# Patient Record
Sex: FEMALE | Race: WHITE | NOT HISPANIC OR LATINO
[De-identification: names, ages, dates, MRNs, and addresses within clinical notes are randomized per-mention and may not be internally consistent; named-entity substitution may affect disease eponyms.]

---

## 2020-10-02 ENCOUNTER — RX ONLY (OUTPATIENT)
Age: 30
Setting detail: RX ONLY
End: 2020-10-02

## 2020-10-02 ENCOUNTER — APPOINTMENT (RX ONLY)
Dept: URBAN - METROPOLITAN AREA CLINIC 374 | Facility: CLINIC | Age: 30
Setting detail: DERMATOLOGY
End: 2020-10-02

## 2020-10-02 DIAGNOSIS — L30.1 DYSHIDROSIS [POMPHOLYX]: ICD-10-CM

## 2020-10-02 PROCEDURE — ? PRESCRIPTION MEDICATION MANAGEMENT

## 2020-10-02 PROCEDURE — ? PRESCRIPTION

## 2020-10-02 PROCEDURE — ? COUNSELING

## 2020-10-02 PROCEDURE — 99202 OFFICE O/P NEW SF 15 MIN: CPT

## 2020-10-02 PROCEDURE — ? MEDICATION COUNSELING

## 2020-10-02 RX ORDER — TRIAMCINOLONE ACETONIDE 1 MG/G
0.1 OINTMENT TOPICAL BID
Qty: 1 | Refills: 3 | Status: ERX | COMMUNITY
Start: 2020-10-02

## 2020-10-02 RX ORDER — CLOBETASOL PROPIONATE 0.5 MG/G
OINTMENT TOPICAL BID
Qty: 1 | Refills: 3 | Status: ERX | COMMUNITY
Start: 2020-10-02

## 2020-10-02 RX ADMIN — CLOBETASOL PROPIONATE: 0.5 OINTMENT TOPICAL at 00:00

## 2020-10-02 ASSESSMENT — LOCATION DETAILED DESCRIPTION DERM: LOCATION DETAILED: RIGHT THENAR EMINENCE

## 2020-10-02 ASSESSMENT — LOCATION ZONE DERM: LOCATION ZONE: HAND

## 2020-10-02 ASSESSMENT — LOCATION SIMPLE DESCRIPTION DERM: LOCATION SIMPLE: RIGHT HAND

## 2020-10-02 NOTE — PROCEDURE: MEDICATION COUNSELING
Xelsandiz Pregnancy And Lactation Text: This medication is Pregnancy Category D and is not considered safe during pregnancy.  The risk during breast feeding is also uncertain.

## 2020-10-02 NOTE — PROCEDURE: PRESCRIPTION MEDICATION MANAGEMENT
Initiate Treatment: clobetasol 0.05 % topical ointment Bid: Apply to affected areas of hands BID.
Render In Strict Bullet Format?: No
Detail Level: Zone

## 2020-10-30 ENCOUNTER — APPOINTMENT (RX ONLY)
Dept: URBAN - METROPOLITAN AREA CLINIC 374 | Facility: CLINIC | Age: 30
Setting detail: DERMATOLOGY
End: 2020-10-30

## 2020-10-30 DIAGNOSIS — L30.1 DYSHIDROSIS [POMPHOLYX]: ICD-10-CM | Status: WELL CONTROLLED

## 2020-10-30 PROCEDURE — 99213 OFFICE O/P EST LOW 20 MIN: CPT

## 2020-10-30 PROCEDURE — ? PRESCRIPTION

## 2020-10-30 PROCEDURE — ? PRESCRIPTION MEDICATION MANAGEMENT

## 2020-10-30 PROCEDURE — ? MEDICATION COUNSELING

## 2020-10-30 RX ORDER — CRISABOROLE 20 MG/G
1 OINTMENT TOPICAL BID
Qty: 1 | Refills: 3 | Status: ERX | COMMUNITY
Start: 2020-10-30

## 2020-10-30 RX ADMIN — CRISABOROLE 1: 20 OINTMENT TOPICAL at 00:00

## 2020-10-30 ASSESSMENT — LOCATION DETAILED DESCRIPTION DERM
LOCATION DETAILED: LEFT ULNAR PALM
LOCATION DETAILED: RIGHT ULNAR PALM

## 2020-10-30 ASSESSMENT — SEVERITY ASSESSMENT 2020: SEVERITY 2020: ALMOST CLEAR

## 2020-10-30 ASSESSMENT — LOCATION SIMPLE DESCRIPTION DERM
LOCATION SIMPLE: LEFT HAND
LOCATION SIMPLE: RIGHT HAND

## 2020-10-30 ASSESSMENT — LOCATION ZONE DERM: LOCATION ZONE: HAND

## 2020-10-30 ASSESSMENT — BSA RASH: BSA RASH: 2

## 2020-10-30 NOTE — PROCEDURE: MEDICATION COUNSELING
Anxiety    Hernia  lih Mirvaso Counseling: Mirvaso is a topical medication which can decrease superficial blood flow where applied. Side effects are uncommon and include stinging, redness and allergic reactions.

## 2020-10-30 NOTE — PROCEDURE: PRESCRIPTION MEDICATION MANAGEMENT
Discontinue Regimen: Clobetasol ointment (may restart PRN flare up)
Render In Strict Bullet Format?: No
Detail Level: Zone
Samples Given: Eucrisa
Initiate Treatment: Eucrisa 2 % topical ointment: Apply to hands bid

## 2021-01-29 ENCOUNTER — APPOINTMENT (RX ONLY)
Dept: URBAN - METROPOLITAN AREA CLINIC 374 | Facility: CLINIC | Age: 31
Setting detail: DERMATOLOGY
End: 2021-01-29

## 2021-01-29 DIAGNOSIS — L30.1 DYSHIDROSIS [POMPHOLYX]: ICD-10-CM | Status: IMPROVED

## 2021-01-29 PROCEDURE — 99213 OFFICE O/P EST LOW 20 MIN: CPT

## 2021-01-29 PROCEDURE — ? PREDNISONE TAPER

## 2021-01-29 PROCEDURE — ? PRESCRIPTION MEDICATION MANAGEMENT

## 2021-01-29 PROCEDURE — ? PRESCRIPTION

## 2021-01-29 RX ORDER — PREDNISONE 10 MG/1
1 TABLET ORAL QDAY
Qty: 20 | Refills: 0 | Status: ERX | COMMUNITY
Start: 2021-01-29

## 2021-01-29 RX ADMIN — PREDNISONE 1: 10 TABLET ORAL at 00:00

## 2021-01-29 ASSESSMENT — LOCATION DETAILED DESCRIPTION DERM
LOCATION DETAILED: RIGHT ULNAR PALM
LOCATION DETAILED: LEFT ULNAR PALM

## 2021-01-29 ASSESSMENT — LOCATION ZONE DERM: LOCATION ZONE: HAND

## 2021-01-29 ASSESSMENT — LOCATION SIMPLE DESCRIPTION DERM
LOCATION SIMPLE: RIGHT HAND
LOCATION SIMPLE: LEFT HAND

## 2021-01-29 NOTE — PROCEDURE: PRESCRIPTION MEDICATION MANAGEMENT
Discontinue Regimen: Clobetasol ointment (may restart PRN flare up)
Render In Strict Bullet Format?: No
Detail Level: Zone
Continue Regimen: Eucrisa 2 % topical ointment: Apply to hands bid
Initiate Treatment: prednisone 10 mg tablet Qday: Take 40mg x 2days, 30mg x 2 days, 20mg x 2 days, 10mg x 2days

## 2021-03-26 ENCOUNTER — APPOINTMENT (RX ONLY)
Dept: URBAN - METROPOLITAN AREA CLINIC 374 | Facility: CLINIC | Age: 31
Setting detail: DERMATOLOGY
End: 2021-03-26

## 2021-03-26 DIAGNOSIS — L30.1 DYSHIDROSIS [POMPHOLYX]: ICD-10-CM | Status: IMPROVED

## 2021-03-26 PROCEDURE — ? PRESCRIPTION MEDICATION MANAGEMENT

## 2021-03-26 PROCEDURE — 99213 OFFICE O/P EST LOW 20 MIN: CPT

## 2021-03-26 PROCEDURE — ? PRESCRIPTION

## 2021-03-26 RX ORDER — AMMONIUM LACTATE 12 G/100G
LOTION TOPICAL QDAY
Qty: 1 | Refills: 2 | Status: ERX | COMMUNITY
Start: 2021-03-26

## 2021-03-26 RX ORDER — PREDNISONE 10 MG/1
TABLET ORAL QDAY
Qty: 20 | Refills: 0 | Status: ERX

## 2021-03-26 RX ADMIN — AMMONIUM LACTATE: 12 LOTION TOPICAL at 00:00

## 2021-03-26 ASSESSMENT — LOCATION SIMPLE DESCRIPTION DERM
LOCATION SIMPLE: RIGHT HAND
LOCATION SIMPLE: LEFT HAND

## 2021-03-26 ASSESSMENT — LOCATION DETAILED DESCRIPTION DERM
LOCATION DETAILED: RIGHT ULNAR PALM
LOCATION DETAILED: LEFT ULNAR PALM

## 2021-03-26 ASSESSMENT — LOCATION ZONE DERM: LOCATION ZONE: HAND

## 2021-03-26 NOTE — PROCEDURE: PRESCRIPTION MEDICATION MANAGEMENT
Discontinue Regimen: Prednisone 10mg tablets (finished 3 weeks course- another 3week course of prednisone was sent to the pharmacy incase she needed it during summer)
Render In Strict Bullet Format?: No
Detail Level: Zone
Continue Regimen: Eucrisa 2 % topical ointment: Apply to hands bid\\nCloebtasol 0.05% topical ointment prn flare-up
Initiate Treatment: Ammonium lactate 12% topical lotion Qday to the rashes of the hands

## 2021-06-25 ENCOUNTER — APPOINTMENT (RX ONLY)
Dept: URBAN - METROPOLITAN AREA CLINIC 374 | Facility: CLINIC | Age: 31
Setting detail: DERMATOLOGY
End: 2021-06-25

## 2021-06-25 DIAGNOSIS — L70.0 ACNE VULGARIS: ICD-10-CM

## 2021-06-25 DIAGNOSIS — L30.1 DYSHIDROSIS [POMPHOLYX]: ICD-10-CM | Status: STABLE

## 2021-06-25 PROCEDURE — ? TOPICAL RETINOID COUNSELING

## 2021-06-25 PROCEDURE — ? MEDICATION COUNSELING

## 2021-06-25 PROCEDURE — ? PRESCRIPTION MEDICATION MANAGEMENT

## 2021-06-25 PROCEDURE — ? PRESCRIPTION

## 2021-06-25 PROCEDURE — 99214 OFFICE O/P EST MOD 30 MIN: CPT

## 2021-06-25 PROCEDURE — ? PRESCRIPTION SAMPLES PROVIDED

## 2021-06-25 PROCEDURE — ? COUNSELING

## 2021-06-25 RX ORDER — TRETINOIN 0.5 MG/G
LOTION TOPICAL QHS
Qty: 1 | Refills: 2 | Status: ERX | COMMUNITY
Start: 2021-06-25

## 2021-06-25 RX ORDER — SPIRONOLACTONE 50 MG/1
TABLET, FILM COATED ORAL BID
Qty: 60 | Refills: 2 | Status: ERX | COMMUNITY
Start: 2021-06-25

## 2021-06-25 RX ADMIN — SPIRONOLACTONE: 50 TABLET, FILM COATED ORAL at 00:00

## 2021-06-25 RX ADMIN — TRETINOIN: 0.5 LOTION TOPICAL at 00:00

## 2021-06-25 ASSESSMENT — LOCATION DETAILED DESCRIPTION DERM
LOCATION DETAILED: LEFT INFERIOR CENTRAL MALAR CHEEK
LOCATION DETAILED: LEFT ULNAR PALM
LOCATION DETAILED: RIGHT ULNAR PALM
LOCATION DETAILED: RIGHT INFERIOR CENTRAL MALAR CHEEK

## 2021-06-25 ASSESSMENT — LOCATION SIMPLE DESCRIPTION DERM
LOCATION SIMPLE: RIGHT HAND
LOCATION SIMPLE: LEFT HAND
LOCATION SIMPLE: RIGHT CHEEK
LOCATION SIMPLE: LEFT CHEEK

## 2021-06-25 ASSESSMENT — LOCATION ZONE DERM
LOCATION ZONE: FACE
LOCATION ZONE: HAND

## 2021-06-25 NOTE — PROCEDURE: PRESCRIPTION MEDICATION MANAGEMENT
Render In Strict Bullet Format?: No
Detail Level: Zone
Plan: Prednisone 10 mg tablets (use PRN flare up during summer)\\nClobetasol ointment (can use prn flare-up)
Continue Regimen: Eucrisa 2 % topical ointment: Apply to hands bid\\nAmmonium lactate 12% topical lotion Qday to the rashes of the hands
Initiate Treatment: spironolactone 50 mg tablet: Take one tab po BID\\nAltreno 0.05 % lotion: Apply qhs to the acne of the face

## 2021-06-25 NOTE — PROCEDURE: COUNSELING
Isotretinoin Counseling: Patient should get monthly blood tests, not donate blood, not drive at night if vision affected, not share medication, and not undergo elective surgery for 6 months after tx completed. Side effects reviewed, pt to contact office should one occur.
Benzoyl Peroxide Pregnancy And Lactation Text: This medication is Pregnancy Category C. It is unknown if benzoyl peroxide is excreted in breast milk.
Birth Control Pills Counseling: Birth Control Pill Counseling: I discussed with the patient the potential side effects of OCPs including but not limited to increased risk of stroke, heart attack, thrombophlebitis, deep venous thrombosis, hepatic adenomas, breast changes, GI upset, headaches, and depression.  The patient verbalized understanding of the proper use and possible adverse effects of OCPs. All of the patient's questions and concerns were addressed.
Spironolactone Pregnancy And Lactation Text: This medication can cause feminization of the male fetus and should be avoided during pregnancy. The active metabolite is also found in breast milk.
Azithromycin Counseling:  I discussed with the patient the risks of azithromycin including but not limited to GI upset, allergic reaction, drug rash, diarrhea, and yeast infections.
Minocycline Pregnancy And Lactation Text: This medication is Pregnancy Category D and not consider safe during pregnancy. It is also excreted in breast milk.
Topical Clindamycin Counseling: Patient counseled that this medication may cause skin irritation or allergic reactions.  In the event of skin irritation, the patient was advised to reduce the amount of the drug applied or use it less frequently.   The patient verbalized understanding of the proper use and possible adverse effects of clindamycin.  All of the patient's questions and concerns were addressed.
Isotretinoin Pregnancy And Lactation Text: This medication is Pregnancy Category X and is considered extremely dangerous during pregnancy. It is unknown if it is excreted in breast milk.
Topical Retinoid counseling:  Patient advised to apply a pea-sized amount only at bedtime and wait 30 minutes after washing their face before applying.  If too drying, patient may add a non-comedogenic moisturizer. The patient verbalized understanding of the proper use and possible adverse effects of retinoids.  All of the patient's questions and concerns were addressed.
Birth Control Pills Pregnancy And Lactation Text: This medication should be avoided if pregnant and for the first 30 days post-partum.
Tetracycline Counseling: Patient counseled regarding possible photosensitivity and increased risk for sunburn.  Patient instructed to avoid sunlight, if possible.  When exposed to sunlight, patients should wear protective clothing, sunglasses, and sunscreen.  The patient was instructed to call the office immediately if the following severe adverse effects occur:  hearing changes, easy bruising/bleeding, severe headache, or vision changes.  The patient verbalized understanding of the proper use and possible adverse effects of tetracycline.  All of the patient's questions and concerns were addressed. Patient understands to avoid pregnancy while on therapy due to potential birth defects.
Doxycycline Pregnancy And Lactation Text: This medication is Pregnancy Category D and not consider safe during pregnancy. It is also excreted in breast milk but is considered safe for shorter treatment courses.
Azithromycin Pregnancy And Lactation Text: This medication is considered safe during pregnancy and is also secreted in breast milk.
Sarecycline Counseling: Patient advised regarding possible photosensitivity and discoloration of the teeth, skin, lips, tongue and gums.  Patient instructed to avoid sunlight, if possible.  When exposed to sunlight, patients should wear protective clothing, sunglasses, and sunscreen.  The patient was instructed to call the office immediately if the following severe adverse effects occur:  hearing changes, easy bruising/bleeding, severe headache, or vision changes.  The patient verbalized understanding of the proper use and possible adverse effects of sarecycline.  All of the patient's questions and concerns were addressed.
High Dose Vitamin A Counseling: Side effects reviewed, pt to contact office should one occur.
Topical Retinoid Pregnancy And Lactation Text: This medication is Pregnancy Category C. It is unknown if this medication is excreted in breast milk.
Topical Clindamycin Pregnancy And Lactation Text: This medication is Pregnancy Category B and is considered safe during pregnancy. It is unknown if it is excreted in breast milk.
Erythromycin Counseling:  I discussed with the patient the risks of erythromycin including but not limited to GI upset, allergic reaction, drug rash, diarrhea, increase in liver enzymes, and yeast infections.
Use Enhanced Medication Counseling?: No
Dapsone Counseling: I discussed with the patient the risks of dapsone including but not limited to hemolytic anemia, agranulocytosis, rashes, methemoglobinemia, kidney failure, peripheral neuropathy, headaches, GI upset, and liver toxicity.  Patients who start dapsone require monitoring including baseline LFTs and weekly CBCs for the first month, then every month thereafter.  The patient verbalized understanding of the proper use and possible adverse effects of dapsone.  All of the patient's questions and concerns were addressed.
High Dose Vitamin A Pregnancy And Lactation Text: High dose vitamin A therapy is contraindicated during pregnancy and breast feeding.
Bactrim Counseling:  I discussed with the patient the risks of sulfa antibiotics including but not limited to GI upset, allergic reaction, drug rash, diarrhea, dizziness, photosensitivity, and yeast infections.  Rarely, more serious reactions can occur including but not limited to aplastic anemia, agranulocytosis, methemoglobinemia, blood dyscrasias, liver or kidney failure, lung infiltrates or desquamative/blistering drug rashes.
Detail Level: Detailed
Topical Sulfur Applications Counseling: Topical Sulfur Counseling: Patient counseled that this medication may cause skin irritation or allergic reactions.  In the event of skin irritation, the patient was advised to reduce the amount of the drug applied or use it less frequently.   The patient verbalized understanding of the proper use and possible adverse effects of topical sulfur application.  All of the patient's questions and concerns were addressed.
Erythromycin Pregnancy And Lactation Text: This medication is Pregnancy Category B and is considered safe during pregnancy. It is also excreted in breast milk.
Benzoyl Peroxide Counseling: Patient counseled that medicine may cause skin irritation and bleach clothing.  In the event of skin irritation, the patient was advised to reduce the amount of the drug applied or use it less frequently.   The patient verbalized understanding of the proper use and possible adverse effects of benzoyl peroxide.  All of the patient's questions and concerns were addressed.
Tazorac Counseling:  Patient advised that medication is irritating and drying.  Patient may need to apply sparingly and wash off after an hour before eventually leaving it on overnight.  The patient verbalized understanding of the proper use and possible adverse effects of tazorac.  All of the patient's questions and concerns were addressed.
Dapsone Pregnancy And Lactation Text: This medication is Pregnancy Category C and is not considered safe during pregnancy or breast feeding.
Bactrim Pregnancy And Lactation Text: This medication is Pregnancy Category D and is known to cause fetal risk.  It is also excreted in breast milk.
Spironolactone Counseling: Patient advised regarding risks of diarrhea, abdominal pain, hyperkalemia, birth defects (for female patients), liver toxicity and renal toxicity. The patient may need blood work to monitor liver and kidney function and potassium levels while on therapy. The patient verbalized understanding of the proper use and possible adverse effects of spironolactone.  All of the patient's questions and concerns were addressed.
Topical Sulfur Applications Pregnancy And Lactation Text: This medication is Pregnancy Category C and has an unknown safety profile during pregnancy. It is unknown if this topical medication is excreted in breast milk.
Minocycline Counseling: Patient advised regarding possible photosensitivity and discoloration of the teeth, skin, lips, tongue and gums.  Patient instructed to avoid sunlight, if possible.  When exposed to sunlight, patients should wear protective clothing, sunglasses, and sunscreen.  The patient was instructed to call the office immediately if the following severe adverse effects occur:  hearing changes, easy bruising/bleeding, severe headache, or vision changes.  The patient verbalized understanding of the proper use and possible adverse effects of minocycline.  All of the patient's questions and concerns were addressed.
Tazorac Pregnancy And Lactation Text: This medication is not safe during pregnancy. It is unknown if this medication is excreted in breast milk.
Doxycycline Counseling:  Patient counseled regarding possible photosensitivity and increased risk for sunburn.  Patient instructed to avoid sunlight, if possible.  When exposed to sunlight, patients should wear protective clothing, sunglasses, and sunscreen.  The patient was instructed to call the office immediately if the following severe adverse effects occur:  hearing changes, easy bruising/bleeding, severe headache, or vision changes.  The patient verbalized understanding of the proper use and possible adverse effects of doxycycline.  All of the patient's questions and concerns were addressed.

## 2021-08-20 ENCOUNTER — RX ONLY (OUTPATIENT)
Age: 31
Setting detail: RX ONLY
End: 2021-08-20

## 2021-08-20 ENCOUNTER — APPOINTMENT (RX ONLY)
Dept: URBAN - METROPOLITAN AREA CLINIC 374 | Facility: CLINIC | Age: 31
Setting detail: DERMATOLOGY
End: 2021-08-20

## 2021-08-20 DIAGNOSIS — L21.8 OTHER SEBORRHEIC DERMATITIS: ICD-10-CM

## 2021-08-20 DIAGNOSIS — L70.0 ACNE VULGARIS: ICD-10-CM | Status: IMPROVED

## 2021-08-20 DIAGNOSIS — L30.1 DYSHIDROSIS [POMPHOLYX]: ICD-10-CM | Status: IMPROVED

## 2021-08-20 PROCEDURE — ? PRESCRIPTION MEDICATION MANAGEMENT

## 2021-08-20 PROCEDURE — ? PRESCRIPTION

## 2021-08-20 PROCEDURE — ? RECOMMENDATIONS

## 2021-08-20 PROCEDURE — ? TREATMENT REGIMEN

## 2021-08-20 PROCEDURE — 99214 OFFICE O/P EST MOD 30 MIN: CPT

## 2021-08-20 PROCEDURE — ? COUNSELING

## 2021-08-20 RX ORDER — KETOCONAZOLE 20 MG/ML
SHAMPOO, SUSPENSION TOPICAL QWEEK
Qty: 1 | Refills: 3 | Status: ERX | COMMUNITY
Start: 2021-08-20

## 2021-08-20 RX ORDER — SPIRONOLACTONE 50 MG/1
TABLET, FILM COATED ORAL BID
Qty: 60 | Refills: 4 | Status: ERX | COMMUNITY
Start: 2021-08-20

## 2021-08-20 RX ADMIN — KETOCONAZOLE: 20 SHAMPOO, SUSPENSION TOPICAL at 00:00

## 2021-08-20 ASSESSMENT — LOCATION ZONE DERM
LOCATION ZONE: FACE
LOCATION ZONE: SCALP
LOCATION ZONE: HAND

## 2021-08-20 ASSESSMENT — LOCATION DETAILED DESCRIPTION DERM
LOCATION DETAILED: LEFT INFERIOR CENTRAL MALAR CHEEK
LOCATION DETAILED: RIGHT ULNAR PALM
LOCATION DETAILED: RIGHT INFERIOR CENTRAL MALAR CHEEK
LOCATION DETAILED: LEFT SUPERIOR PARIETAL SCALP
LOCATION DETAILED: LEFT ULNAR PALM
LOCATION DETAILED: RIGHT CENTRAL PARIETAL SCALP

## 2021-08-20 ASSESSMENT — LOCATION SIMPLE DESCRIPTION DERM
LOCATION SIMPLE: RIGHT HAND
LOCATION SIMPLE: LEFT HAND
LOCATION SIMPLE: SCALP
LOCATION SIMPLE: RIGHT CHEEK
LOCATION SIMPLE: LEFT CHEEK

## 2021-08-20 NOTE — PROCEDURE: PRESCRIPTION MEDICATION MANAGEMENT
Render In Strict Bullet Format?: No
Detail Level: Zone
Plan: Prednisone 10 mg tablets (use PRN flare up during summer)\\nClobetasol ointment (can use prn flare-up)
Continue Regimen: Eucrisa 2 % topical ointment: Apply to hands bid\\nAmmonium lactate 12% topical lotion Qday to the rashes of the hands
Initiate Treatment: OTC Cutemol cream Qday to the eczema of the hands \\nOTC eucerin hand moisturizers Qday to the rashes of the hands
Continue Regimen: spironolactone 50 mg tablet: Take one tab po BID\\nAltreno 0.05 % lotion: Apply qhs to the acne of the face
Initiate Treatment: ketoconazole 2 % shampoo: Use each time wash hair, lather onto scalp, let sit for 3-5 minutes then rinse well qweek

## 2021-08-20 NOTE — PROCEDURE: RECOMMENDATIONS
Recommendations (Free Text): Cutemol cream or moisturizer for the eczema of hands
Render Risk Assessment In Note?: no
Detail Level: Detailed
Recommendation Preamble: The following recommendations were made during the visit:

## 2021-12-17 ENCOUNTER — RX ONLY (OUTPATIENT)
Age: 31
Setting detail: RX ONLY
End: 2021-12-17

## 2021-12-17 ENCOUNTER — APPOINTMENT (RX ONLY)
Dept: URBAN - METROPOLITAN AREA CLINIC 374 | Facility: CLINIC | Age: 31
Setting detail: DERMATOLOGY
End: 2021-12-17

## 2021-12-17 DIAGNOSIS — L21.8 OTHER SEBORRHEIC DERMATITIS: ICD-10-CM | Status: INADEQUATELY CONTROLLED

## 2021-12-17 DIAGNOSIS — L70.0 ACNE VULGARIS: ICD-10-CM | Status: INADEQUATELY CONTROLLED

## 2021-12-17 DIAGNOSIS — L30.1 DYSHIDROSIS [POMPHOLYX]: ICD-10-CM | Status: WELL CONTROLLED

## 2021-12-17 PROCEDURE — 99214 OFFICE O/P EST MOD 30 MIN: CPT

## 2021-12-17 PROCEDURE — ? TREATMENT REGIMEN

## 2021-12-17 PROCEDURE — ? MEDICATION COUNSELING

## 2021-12-17 PROCEDURE — ? PRESCRIPTION MEDICATION MANAGEMENT

## 2021-12-17 PROCEDURE — ? COUNSELING

## 2021-12-17 PROCEDURE — ? PRESCRIPTION

## 2021-12-17 RX ORDER — CLASCOTERONE 1 G/100G
CREAM TOPICAL QAM
Qty: 60 | Refills: 3 | Status: CANCELLED | COMMUNITY
Start: 2021-12-17

## 2021-12-17 RX ORDER — KETOCONAZOLE 20 MG/ML
SHAMPOO, SUSPENSION TOPICAL QDAY
Qty: 120 | Refills: 3 | Status: ERX

## 2021-12-17 RX ORDER — METRONIDAZOLE 7.5 MG/G
CREAM TOPICAL QHS
Qty: 45 | Refills: 3 | Status: ERX | COMMUNITY
Start: 2021-12-17

## 2021-12-17 RX ORDER — CLOBETASOL PROPIONATE 0.5 MG/ML
SOLUTION TOPICAL QHS
Qty: 50 | Refills: 3 | Status: ERX | COMMUNITY
Start: 2021-12-17

## 2021-12-17 RX ADMIN — CLASCOTERONE: 1 CREAM TOPICAL at 00:00

## 2021-12-17 RX ADMIN — METRONIDAZOLE: 7.5 CREAM TOPICAL at 00:00

## 2021-12-17 RX ADMIN — CLOBETASOL PROPIONATE: 0.5 SOLUTION TOPICAL at 00:00

## 2021-12-17 ASSESSMENT — LOCATION ZONE DERM
LOCATION ZONE: FACE
LOCATION ZONE: HAND
LOCATION ZONE: SCALP

## 2021-12-17 ASSESSMENT — LOCATION SIMPLE DESCRIPTION DERM
LOCATION SIMPLE: SCALP
LOCATION SIMPLE: LEFT CHEEK
LOCATION SIMPLE: LEFT HAND
LOCATION SIMPLE: RIGHT CHEEK
LOCATION SIMPLE: RIGHT HAND

## 2021-12-17 ASSESSMENT — LOCATION DETAILED DESCRIPTION DERM
LOCATION DETAILED: LEFT SUPERIOR PARIETAL SCALP
LOCATION DETAILED: LEFT ULNAR PALM
LOCATION DETAILED: RIGHT CENTRAL PARIETAL SCALP
LOCATION DETAILED: RIGHT INFERIOR CENTRAL MALAR CHEEK
LOCATION DETAILED: LEFT INFERIOR CENTRAL MALAR CHEEK
LOCATION DETAILED: RIGHT ULNAR PALM

## 2021-12-17 NOTE — PROCEDURE: PRESCRIPTION MEDICATION MANAGEMENT
Render In Strict Bullet Format?: No
Detail Level: Zone
Continue Regimen: Eucrisa 2 % topical ointment: Apply to hands bid\\nAmmonium lactate 12% topical lotion Qday to the rashes of the hands
Discontinue Regimen: Altreno 0.05 % lotion: Apply qhs to the acne of the face
Continue Regimen: spironolactone 50 mg tablet: Take one tab po BID
Initiate Treatment: Rosadan 0.75 % topical cream Qhs: Apply thin layer to face qhs\\nWinlevi 1 % topical cream Qam: Apply a thin layer to face QAM
Modify Regimen: ketoconazole 2 % shampoo: Use each time wash hair, lather onto scalp, let sit for 3-5 minutes then rinse well qday
Initiate Treatment: clobetasol 0.05 % scalp solution QHS: Scatter several drops onto affected areas of scalp QHS/ prn flares

## 2022-01-17 RX ORDER — CLASCOTERONE 1 G/100G
CREAM TOPICAL QAM
Qty: 60 | Refills: 3 | Status: CANCELLED

## 2022-01-26 ENCOUNTER — RX ONLY (OUTPATIENT)
Age: 32
Setting detail: RX ONLY
End: 2022-01-26

## 2022-01-26 RX ORDER — DAPSONE 50 MG/G
1 GEL TOPICAL QAM
Qty: 60 | Refills: 2 | Status: ERX | COMMUNITY
Start: 2022-01-26

## 2022-02-18 ENCOUNTER — APPOINTMENT (RX ONLY)
Dept: URBAN - METROPOLITAN AREA CLINIC 374 | Facility: CLINIC | Age: 32
Setting detail: DERMATOLOGY
End: 2022-02-18

## 2022-02-18 DIAGNOSIS — L21.8 OTHER SEBORRHEIC DERMATITIS: ICD-10-CM | Status: INADEQUATELY CONTROLLED

## 2022-02-18 DIAGNOSIS — L70.0 ACNE VULGARIS: ICD-10-CM | Status: INADEQUATELY CONTROLLED

## 2022-02-18 DIAGNOSIS — L30.1 DYSHIDROSIS [POMPHOLYX]: ICD-10-CM | Status: WELL CONTROLLED

## 2022-02-18 PROCEDURE — ? TREATMENT REGIMEN

## 2022-02-18 PROCEDURE — ? ADDITIONAL NOTES

## 2022-02-18 PROCEDURE — ? MEDICATION COUNSELING

## 2022-02-18 PROCEDURE — ? PRESCRIPTION

## 2022-02-18 PROCEDURE — ? PRESCRIPTION MEDICATION MANAGEMENT

## 2022-02-18 PROCEDURE — 99214 OFFICE O/P EST MOD 30 MIN: CPT

## 2022-02-18 PROCEDURE — ? COUNSELING

## 2022-02-18 RX ORDER — DOXYCYCLINE 100 MG/1
1 TABLET, FILM COATED ORAL QDAY
Qty: 30 | Refills: 1 | Status: ERX | COMMUNITY
Start: 2022-02-18

## 2022-02-18 RX ORDER — TRIAMCINOLONE ACETONIDE 1 MG/ML
1 LOTION TOPICAL QHS
Qty: 60 | Refills: 3 | Status: ERX | COMMUNITY
Start: 2022-02-18

## 2022-02-18 RX ADMIN — DOXYCYCLINE 1: 100 TABLET, FILM COATED ORAL at 00:00

## 2022-02-18 RX ADMIN — TRIAMCINOLONE ACETONIDE 1: 1 LOTION TOPICAL at 00:00

## 2022-02-18 ASSESSMENT — LOCATION DETAILED DESCRIPTION DERM
LOCATION DETAILED: RIGHT ULNAR PALM
LOCATION DETAILED: RIGHT INFERIOR CENTRAL MALAR CHEEK
LOCATION DETAILED: LEFT ULNAR PALM
LOCATION DETAILED: RIGHT CENTRAL PARIETAL SCALP
LOCATION DETAILED: LEFT SUPERIOR PARIETAL SCALP
LOCATION DETAILED: LEFT INFERIOR CENTRAL MALAR CHEEK

## 2022-02-18 ASSESSMENT — LOCATION SIMPLE DESCRIPTION DERM
LOCATION SIMPLE: RIGHT HAND
LOCATION SIMPLE: LEFT HAND
LOCATION SIMPLE: RIGHT CHEEK
LOCATION SIMPLE: LEFT CHEEK
LOCATION SIMPLE: SCALP

## 2022-02-18 ASSESSMENT — LOCATION ZONE DERM
LOCATION ZONE: SCALP
LOCATION ZONE: HAND
LOCATION ZONE: FACE

## 2022-02-18 NOTE — PROCEDURE: COUNSELING
Winlevi Pregnancy And Lactation Text: This medication is considered safe during pregnancy and breastfeeding.
Topical Sulfur Applications Pregnancy And Lactation Text: This medication is Pregnancy Category C and has an unknown safety profile during pregnancy. It is unknown if this topical medication is excreted in breast milk.
Bactrim Pregnancy And Lactation Text: This medication is Pregnancy Category D and is known to cause fetal risk.  It is also excreted in breast milk.
Detail Level: Detailed
High Dose Vitamin A Counseling: Side effects reviewed, pt to contact office should one occur.
Erythromycin Counseling:  I discussed with the patient the risks of erythromycin including but not limited to GI upset, allergic reaction, drug rash, diarrhea, increase in liver enzymes, and yeast infections.
Sarecycline Counseling: Patient advised regarding possible photosensitivity and discoloration of the teeth, skin, lips, tongue and gums.  Patient instructed to avoid sunlight, if possible.  When exposed to sunlight, patients should wear protective clothing, sunglasses, and sunscreen.  The patient was instructed to call the office immediately if the following severe adverse effects occur:  hearing changes, easy bruising/bleeding, severe headache, or vision changes.  The patient verbalized understanding of the proper use and possible adverse effects of sarecycline.  All of the patient's questions and concerns were addressed.
Use Enhanced Medication Counseling?: No
Isotretinoin Pregnancy And Lactation Text: This medication is Pregnancy Category X and is considered extremely dangerous during pregnancy. It is unknown if it is excreted in breast milk.
Spironolactone Pregnancy And Lactation Text: This medication can cause feminization of the male fetus and should be avoided during pregnancy. The active metabolite is also found in breast milk.
Minocycline Pregnancy And Lactation Text: This medication is Pregnancy Category D and not consider safe during pregnancy. It is also excreted in breast milk.
Benzoyl Peroxide Pregnancy And Lactation Text: This medication is Pregnancy Category C. It is unknown if benzoyl peroxide is excreted in breast milk.
Dapsone Counseling: I discussed with the patient the risks of dapsone including but not limited to hemolytic anemia, agranulocytosis, rashes, methemoglobinemia, kidney failure, peripheral neuropathy, headaches, GI upset, and liver toxicity.  Patients who start dapsone require monitoring including baseline LFTs and weekly CBCs for the first month, then every month thereafter.  The patient verbalized understanding of the proper use and possible adverse effects of dapsone.  All of the patient's questions and concerns were addressed.
Tazorac Pregnancy And Lactation Text: This medication is not safe during pregnancy. It is unknown if this medication is excreted in breast milk.
Erythromycin Pregnancy And Lactation Text: This medication is Pregnancy Category B and is considered safe during pregnancy. It is also excreted in breast milk.
Dapsone Pregnancy And Lactation Text: This medication is Pregnancy Category C and is not considered safe during pregnancy or breast feeding.
Azelaic Acid Counseling: Patient counseled that medicine may cause skin irritation and to avoid applying near the eyes.  In the event of skin irritation, the patient was advised to reduce the amount of the drug applied or use it less frequently.   The patient verbalized understanding of the proper use and possible adverse effects of azelaic acid.  All of the patient's questions and concerns were addressed.
Topical Clindamycin Counseling: Patient counseled that this medication may cause skin irritation or allergic reactions.  In the event of skin irritation, the patient was advised to reduce the amount of the drug applied or use it less frequently.   The patient verbalized understanding of the proper use and possible adverse effects of clindamycin.  All of the patient's questions and concerns were addressed.
Birth Control Pills Counseling: Birth Control Pill Counseling: I discussed with the patient the potential side effects of OCPs including but not limited to increased risk of stroke, heart attack, thrombophlebitis, deep venous thrombosis, hepatic adenomas, breast changes, GI upset, headaches, and depression.  The patient verbalized understanding of the proper use and possible adverse effects of OCPs. All of the patient's questions and concerns were addressed.
Tetracycline Counseling: Patient counseled regarding possible photosensitivity and increased risk for sunburn.  Patient instructed to avoid sunlight, if possible.  When exposed to sunlight, patients should wear protective clothing, sunglasses, and sunscreen.  The patient was instructed to call the office immediately if the following severe adverse effects occur:  hearing changes, easy bruising/bleeding, severe headache, or vision changes.  The patient verbalized understanding of the proper use and possible adverse effects of tetracycline.  All of the patient's questions and concerns were addressed. Patient understands to avoid pregnancy while on therapy due to potential birth defects.
Topical Retinoid counseling:  Patient advised to apply a pea-sized amount only at bedtime and wait 30 minutes after washing their face before applying.  If too drying, patient may add a non-comedogenic moisturizer. The patient verbalized understanding of the proper use and possible adverse effects of retinoids.  All of the patient's questions and concerns were addressed.
Azithromycin Counseling:  I discussed with the patient the risks of azithromycin including but not limited to GI upset, allergic reaction, drug rash, diarrhea, and yeast infections.
Azithromycin Pregnancy And Lactation Text: This medication is considered safe during pregnancy and is also secreted in breast milk.
Azelaic Acid Pregnancy And Lactation Text: This medication is considered safe during pregnancy and breast feeding.
Isotretinoin Counseling: Patient should get monthly blood tests, not donate blood, not drive at night if vision affected, not share medication, and not undergo elective surgery for 6 months after tx completed. Side effects reviewed, pt to contact office should one occur.
Minocycline Counseling: Patient advised regarding possible photosensitivity and discoloration of the teeth, skin, lips, tongue and gums.  Patient instructed to avoid sunlight, if possible.  When exposed to sunlight, patients should wear protective clothing, sunglasses, and sunscreen.  The patient was instructed to call the office immediately if the following severe adverse effects occur:  hearing changes, easy bruising/bleeding, severe headache, or vision changes.  The patient verbalized understanding of the proper use and possible adverse effects of minocycline.  All of the patient's questions and concerns were addressed.
High Dose Vitamin A Pregnancy And Lactation Text: High dose vitamin A therapy is contraindicated during pregnancy and breast feeding.
Doxycycline Counseling:  Patient counseled regarding possible photosensitivity and increased risk for sunburn.  Patient instructed to avoid sunlight, if possible.  When exposed to sunlight, patients should wear protective clothing, sunglasses, and sunscreen.  The patient was instructed to call the office immediately if the following severe adverse effects occur:  hearing changes, easy bruising/bleeding, severe headache, or vision changes.  The patient verbalized understanding of the proper use and possible adverse effects of doxycycline.  All of the patient's questions and concerns were addressed.
Topical Retinoid Pregnancy And Lactation Text: This medication is Pregnancy Category C. It is unknown if this medication is excreted in breast milk.
Topical Clindamycin Pregnancy And Lactation Text: This medication is Pregnancy Category B and is considered safe during pregnancy. It is unknown if it is excreted in breast milk.
Tazorac Counseling:  Patient advised that medication is irritating and drying.  Patient may need to apply sparingly and wash off after an hour before eventually leaving it on overnight.  The patient verbalized understanding of the proper use and possible adverse effects of tazorac.  All of the patient's questions and concerns were addressed.
Doxycycline Pregnancy And Lactation Text: This medication is Pregnancy Category D and not consider safe during pregnancy. It is also excreted in breast milk but is considered safe for shorter treatment courses.
Topical Sulfur Applications Counseling: Topical Sulfur Counseling: Patient counseled that this medication may cause skin irritation or allergic reactions.  In the event of skin irritation, the patient was advised to reduce the amount of the drug applied or use it less frequently.   The patient verbalized understanding of the proper use and possible adverse effects of topical sulfur application.  All of the patient's questions and concerns were addressed.
Bactrim Counseling:  I discussed with the patient the risks of sulfa antibiotics including but not limited to GI upset, allergic reaction, drug rash, diarrhea, dizziness, photosensitivity, and yeast infections.  Rarely, more serious reactions can occur including but not limited to aplastic anemia, agranulocytosis, methemoglobinemia, blood dyscrasias, liver or kidney failure, lung infiltrates or desquamative/blistering drug rashes.
Winlevi Counseling:  I discussed with the patient the risks of topical clascoterone including but not limited to erythema, scaling, itching, and stinging. Patient voiced their understanding.
Benzoyl Peroxide Counseling: Patient counseled that medicine may cause skin irritation and bleach clothing.  In the event of skin irritation, the patient was advised to reduce the amount of the drug applied or use it less frequently.   The patient verbalized understanding of the proper use and possible adverse effects of benzoyl peroxide.  All of the patient's questions and concerns were addressed.
Birth Control Pills Pregnancy And Lactation Text: This medication should be avoided if pregnant and for the first 30 days post-partum.
Spironolactone Counseling: Patient advised regarding risks of diarrhea, abdominal pain, hyperkalemia, birth defects (for female patients), liver toxicity and renal toxicity. The patient may need blood work to monitor liver and kidney function and potassium levels while on therapy. The patient verbalized understanding of the proper use and possible adverse effects of spironolactone.  All of the patient's questions and concerns were addressed.

## 2022-02-18 NOTE — PROCEDURE: PRESCRIPTION MEDICATION MANAGEMENT
Render In Strict Bullet Format?: No
Detail Level: Zone
Continue Regimen: Eucrisa 2 % topical ointment: Apply to hands bid\\nAmmonium lactate 12% topical lotion Qday to the rashes of the hands
Samples Given: Winlevi 1% topical cream
Continue Regimen: spironolactone 50 mg tablet: Take one tab po BID\\nRosadan 0.75 % topical cream: Apply thin layer to face qhs\\nWinlevi 1 % topical cream: Apply a thin layer to face QAM (samples only, script not covered and cost $500 for patient)
Initiate Treatment: doxycycline monohydrate 100 mg tablet: Take 1 tab po QDAY with food
Continue Regimen: ketoconazole 2 % shampoo: Use each time wash hair, lather onto scalp, let sit for 3-5 minutes then rinse well qday
Initiate Treatment: triamcinolone acetonide 0.1 % lotion: Apply a few scattered drops QHS to AA of scalp prn flare
Discontinue Regimen: clobetasol 0.05 % scalp solution (too drying)

## 2022-04-06 ENCOUNTER — RX ONLY (OUTPATIENT)
Age: 32
Setting detail: RX ONLY
End: 2022-04-06

## 2022-04-06 RX ORDER — SPIRONOLACTONE 50 MG/1
TABLET, FILM COATED ORAL BID
Qty: 60 | Refills: 4 | Status: ERX

## 2022-04-15 ENCOUNTER — APPOINTMENT (RX ONLY)
Dept: URBAN - METROPOLITAN AREA CLINIC 374 | Facility: CLINIC | Age: 32
Setting detail: DERMATOLOGY
End: 2022-04-15

## 2022-04-15 DIAGNOSIS — L70.0 ACNE VULGARIS: ICD-10-CM | Status: IMPROVED

## 2022-04-15 DIAGNOSIS — Z79.899 OTHER LONG TERM (CURRENT) DRUG THERAPY: ICD-10-CM | Status: INADEQUATELY CONTROLLED

## 2022-04-15 DIAGNOSIS — L40.0 PSORIASIS VULGARIS: ICD-10-CM | Status: INADEQUATELY CONTROLLED

## 2022-04-15 PROCEDURE — ? TREATMENT REGIMEN

## 2022-04-15 PROCEDURE — ? PRESCRIPTION MEDICATION MANAGEMENT

## 2022-04-15 PROCEDURE — ? DMARD INITIATION

## 2022-04-15 PROCEDURE — ? INTENSIVE DRUG MONITORING FOR TOXICITY

## 2022-04-15 PROCEDURE — ? HIGH RISK MEDICATION MONITORING

## 2022-04-15 PROCEDURE — ? PRESCRIPTION

## 2022-04-15 PROCEDURE — ? ADDITIONAL NOTES

## 2022-04-15 PROCEDURE — ? COUNSELING

## 2022-04-15 PROCEDURE — 99214 OFFICE O/P EST MOD 30 MIN: CPT

## 2022-04-15 RX ORDER — DOXYCYCLINE 100 MG/1
1 TABLET, FILM COATED ORAL QDAY
Qty: 30 | Refills: 1 | Status: ERX

## 2022-04-15 RX ORDER — SPIRONOLACTONE 50 MG/1
TABLET, FILM COATED ORAL TID
Qty: 90 | Refills: 3 | Status: ERX

## 2022-04-15 ASSESSMENT — LOCATION SIMPLE DESCRIPTION DERM
LOCATION SIMPLE: RIGHT CHEEK
LOCATION SIMPLE: SCALP
LOCATION SIMPLE: RIGHT HAND
LOCATION SIMPLE: LEFT CHEEK
LOCATION SIMPLE: LEFT HAND

## 2022-04-15 ASSESSMENT — LOCATION ZONE DERM
LOCATION ZONE: FACE
LOCATION ZONE: HAND
LOCATION ZONE: SCALP

## 2022-04-15 ASSESSMENT — LOCATION DETAILED DESCRIPTION DERM
LOCATION DETAILED: LEFT INFERIOR CENTRAL MALAR CHEEK
LOCATION DETAILED: LEFT THENAR EMINENCE
LOCATION DETAILED: RIGHT ULNAR PALM
LOCATION DETAILED: RIGHT CENTRAL PARIETAL SCALP
LOCATION DETAILED: RIGHT INFERIOR CENTRAL MALAR CHEEK
LOCATION DETAILED: LEFT SUPERIOR PARIETAL SCALP

## 2022-04-15 ASSESSMENT — BSA PSORIASIS: % BODY COVERED IN PSORIASIS: 10

## 2022-04-15 ASSESSMENT — PGA PSORIASIS: PGA PSORIASIS 2020: MODERATE

## 2022-04-15 NOTE — PROCEDURE: DMARD INITIATION
Cimzia Monitoring Guidelines: A yearly test for tuberculosis is required while taking Cimzia.
Mycophenolate Mofetil Monitoring Guidelines: The patient should be seen frequently at the start of therapy and then every 6 months when stable. A CBC, including platelet count, should be drawn weekly for the first 2-3 months. Starting in the fourth month the CBC should be drawn once a month for the first year. LFTs should be drawn after one month and then once a quarter.
Is Phototherapy Contraindicated?: No
Xolair Monitoring Guidelines: The patient should be monitored during dosing for anaphylaxis.
Methotrexate Dosing: 12.5mg taken once weekly
Cosentyx Monitoring Guidelines: A yearly test for tuberculosis is required while taking Cosentyx.
Cyclosporine Dosing: 2 to 5 mg/kg/day divided BID. The medication should be taken with a glass of water after breakfast and dinner.
Humira Dosing: 80 mg SC day 0, 40 mg SC day 7, then 40 mg SC every other week
Plaquenil Dosing: 200mg daily
Otezla Monitoring Guidelines: The patient should be monitored regular for depression and weight loss while taking Otezla. BUN and creatinine should be monitored on a regular basis.
Simponi Dosing: 50 mg SC every month
Taltz Dosing: 160mg SC x 1 at weeks 0 then 80mg SC at weeks 2, 4, 6, 8, 10 and 12 then 80mg SC every four weeks
Simponi Monitoring Guidelines: A yearly test for tuberculosis is required while taking Simponi.
Xolair Dosing: 300mg SC every 4 weeks
Cyclosporine Monitoring Guidelines: The patient should be reevaluated in person every two weeks for the first 1-2 months and then monthly. Labs should follow a similar schedule and include: creatinine, BUN, CBC, LFTs, Lipids, magnesium, potassium, and uric acid. Blood pressure should be monitored at every visit as well.
Plaquenil Monitoring Guidelines: The patient should be seen regularly while taking Plaquenil. The patient should have their visual field evaluated every year for signs of retinopathy. A CBC should be ordered monthly for the first 3 months and then every 4-6 months after that.
Siliq Monitoring Guidelines: A yearly test for tuberculosis is required while taking Siliq.
Dupixent Monitoring Guidelines: A yearly test for tuberculosis is required while taking Dupixent.\\nPatient will be continuously monitored for parasitic infections.
Remicade Monitoring Guidelines: A yearly test for tuberculosis is required while taking Remicade. A CBC should be ordered every 3-4 months on an ongoing basis while receiving infusions.
Dupixent Dosing: 600 mg SC day 0 then 300 mg SC every other week
Ilumya Dosing: 100 mg SC week 0 and week 4 then every 12 weeks thereafter
Remicade Dosing: 5mg/kg at week 0,2 and 6
Cimzia Dosing: 400 mg SC every other week
Mycophenolate Mofetil Dosing: 500mg twice daily
Skyrizi Dosing: 150 mg SC week 0 and week 4 then every 12 weeks thereafter
Enbrel Monitoring Guidelines: A yearly test for tuberculosis is required while taking Enbrel.
Detail Level: Simple
Tremfya Dosing: 100 mg SC week 0 and week 4 then every 8 weeks thereafter
Skyrizi Monitoring Guidelines: A yearly test for tuberculosis is required while taking Skyrizi.
Diagnosis (Required): Psoriasis
Pregnancy Warning Text: This medication is not considered safe during pregnancy and precaution should be taken to avoid conception.
Humira Monitoring Guidelines: A yearly test for tuberculosis is required while taking Humira.
Stelara Monitoring Guidelines: A yearly test for tuberculosis is required while taking Stelara.
Is Methotrexate Contraindicated?: Yes
Lactation Warning Text: This medication is excreted in breast milk.
Ilumya Monitoring Guidelines: A yearly test for tuberculosis is required while taking Ilumya.
Taltz Monitoring Guidelines: A yearly test for tuberculosis is required while taking Taltz.
Dmard (Required): Otezla
Cosentyx Dosing: 300 mg SC week 0, 1, 2, 3, and 4 then every 4 weeks after that
Enbrel Dosing: 50 mg SC twice weekly for 3 months then once a week there after
Otezla Dosing: 10mg qam day 1, 10mg BID day 2, 10mg qam and 20mg day 3, 20mg BID day 4, 20mg qam and 30mg qpm day 5, and 30mg BID on day 6 and thereafter
Imuran Dosing: 50mg twice daily
Tremfya Monitoring Guidelines: A yearly test for tuberculosis is required while taking Tremfya.
Siliq Dosing: 210 mg SC at week 0,1 and 2 and then every 2 weeks thereafter
Imuran Monitoring Guidelines: The patient should be evaluated monthly for the first 3 months and then once every three months after that. Labs should be drawn twice weekly for the first 2 months and then every 3 months after that. Labs include: CBC and LFTs.
Stelara Dosing: 45mg SC at week 0 and 4 and then every 12 weeks thereafter
Xeljanz Dosing: 5 mg taken orally twice daily
Methotrexate Monitoring Guidelines: The patient should be evaluated monthly for the first 3 months and then once every three months after that. Labs should be drawn 1 week after the first dose and then weekly for the first month. Labs should also be drawn 1-2 after raising the dose and before further dose escalations. Eventually labs should be drawn approximately once a quarter. Labs include CBC, LFTs, creatinine, and BUN.
Xeljanz Monitoring Guidelines: The patient should be seen regularly while taking Xeljanz. A CBC and Lipid panel should be checked 4 to 8 weeks after starting therapy and then every 3 months after that.

## 2022-04-15 NOTE — PROCEDURE: PRESCRIPTION MEDICATION MANAGEMENT
Render In Strict Bullet Format?: No
Samples Given: Winlevi 1% topical cream
Continue Regimen: doxycycline monohydrate 100 mg tablet: Take 1 tab po QDAY with food\\nRosadan 0.75 % topical cream: Apply thin layer to face qhs\\nWinlevi 1 % topical cream: Apply a thin layer to face QAM (samples only, script not covered and cost $500 for patient)
Detail Level: Zone
Initiate Treatment: spironolactone 50 mg tablet: Take one tab po TID
Samples Given: Otezla starter pack
Plan: Pt will call in 3 weeks to say if she likes the Otezla, if so will proceed with script and PA
Continue Regimen: ketoconazole 2 % shampoo: Use each time wash hair, lather onto scalp, let sit for 3-5 minutes then rinse well qday\\nEucrisa 2 % topical ointment: Apply to hands bid\\nAmmonium lactate 12% topical lotion Qday to the rashes of the hands.\\ntriamcinolone acetonide 0.1 % lotion: Apply a few scattered drops QHS to AA of scalp prn flare
Discontinue Regimen: clobetasol 0.05 % scalp solution (too drying)

## 2022-04-15 NOTE — PROCEDURE: INTENSIVE DRUG MONITORING FOR TOXICITY
Explanation (Does Not Render In The Note): A drug that requires intensive monitoring is a therapeutic agent that has the potential to cause serious morbidity or death. The monitoring is performed for assessment of these adverse effects and not primarily for assessment of therapeutic efficacy. The monitoring should be that which is generally accepted practice for the agent, but may be patient specific in some cases. Monitoring by history or examination does not qualify. Examples of monitoring that does not qualify include monitoring glucose levels during insulin therapy as the primary reason is the therapeutic effect or annual electrolytes and renal function for a patient on a diuretic as the frequency does not meet the threshold.
Drug Being Monitored: Otezla

## 2022-04-28 ENCOUNTER — RX ONLY (OUTPATIENT)
Age: 32
Setting detail: RX ONLY
End: 2022-04-28

## 2022-04-28 RX ORDER — APREMILAST 30 MG/1
1 TABLET, FILM COATED ORAL BID
Qty: 60 | Refills: 6 | Status: ERX | COMMUNITY
Start: 2022-04-28

## 2022-05-03 ENCOUNTER — RX ONLY (OUTPATIENT)
Age: 32
Setting detail: RX ONLY
End: 2022-05-03

## 2022-05-03 RX ORDER — APREMILAST 10-20-30MG
KIT ORAL
Qty: 1 | Refills: 0 | Status: ERX | COMMUNITY
Start: 2022-05-03

## 2022-05-03 RX ORDER — KETOCONAZOLE 20 MG/ML
SHAMPOO, SUSPENSION TOPICAL QDAY
Qty: 120 | Refills: 3 | Status: ERX

## 2022-05-16 ENCOUNTER — RX ONLY (OUTPATIENT)
Age: 32
Setting detail: RX ONLY
End: 2022-05-16

## 2022-05-16 RX ORDER — CLASCOTERONE 1 G/100G
CREAM TOPICAL BID
Qty: 60 | Refills: 3 | Status: ERX | COMMUNITY
Start: 2022-05-16

## 2022-06-13 NOTE — PROCEDURE: MEDICATION COUNSELING
High Dose Vitamin A Counseling: Side effects reviewed, pt to contact office should one occur. Eucrisa Counseling: Patient may experience a mild burning sensation during topical application. Eucrisa is not approved in children less than 2 years of age.

## 2022-06-16 ENCOUNTER — APPOINTMENT (RX ONLY)
Dept: URBAN - METROPOLITAN AREA CLINIC 374 | Facility: CLINIC | Age: 32
Setting detail: DERMATOLOGY
End: 2022-06-16

## 2022-06-16 DIAGNOSIS — L40.0 PSORIASIS VULGARIS: ICD-10-CM | Status: IMPROVED

## 2022-06-16 DIAGNOSIS — L70.0 ACNE VULGARIS: ICD-10-CM | Status: WELL CONTROLLED

## 2022-06-16 DIAGNOSIS — Z79.899 OTHER LONG TERM (CURRENT) DRUG THERAPY: ICD-10-CM | Status: IMPROVED

## 2022-06-16 PROCEDURE — ? INTENSIVE DRUG MONITORING FOR TOXICITY

## 2022-06-16 PROCEDURE — ? OTEZLA MONITORING

## 2022-06-16 PROCEDURE — 99214 OFFICE O/P EST MOD 30 MIN: CPT

## 2022-06-16 PROCEDURE — ? TREATMENT REGIMEN

## 2022-06-16 PROCEDURE — ? PRESCRIPTION

## 2022-06-16 PROCEDURE — ? COUNSELING

## 2022-06-16 PROCEDURE — ? PRESCRIPTION MEDICATION MANAGEMENT

## 2022-06-16 RX ORDER — KETOCONAZOLE 20 MG/ML
1 SHAMPOO, SUSPENSION TOPICAL
Qty: 120 | Refills: 3 | Status: ERX

## 2022-06-16 ASSESSMENT — LOCATION DETAILED DESCRIPTION DERM
LOCATION DETAILED: RIGHT CENTRAL PARIETAL SCALP
LOCATION DETAILED: LEFT INFERIOR CENTRAL MALAR CHEEK
LOCATION DETAILED: LEFT SUPERIOR PARIETAL SCALP
LOCATION DETAILED: RIGHT INFERIOR CENTRAL MALAR CHEEK

## 2022-06-16 ASSESSMENT — LOCATION SIMPLE DESCRIPTION DERM
LOCATION SIMPLE: SCALP
LOCATION SIMPLE: RIGHT CHEEK
LOCATION SIMPLE: LEFT CHEEK

## 2022-06-16 ASSESSMENT — LOCATION ZONE DERM
LOCATION ZONE: FACE
LOCATION ZONE: SCALP

## 2022-06-16 NOTE — PROCEDURE: PRESCRIPTION MEDICATION MANAGEMENT
Render In Strict Bullet Format?: No
Discontinue Regimen: doxycycline monohydrate 100 mg tablet: Take 1 tab po QDAY with food
Continue Regimen: spironolactone 50 mg tablet: Take one tab po TID\\nWinlevi 1 % topical cream: Apply a thin layer to face QAM\\nRosadan 0.75 % topical cream: Apply thin layer to face qhs (Patient using PRN)
Detail Level: Zone
Continue Regimen: ketoconazole 2 % shampoo: Use each time wash hair, lather onto scalp, let sit for 3-5 minutes then rinse well qday\\nEucrisa 2 % topical ointment: Apply to hands bid\\nAmmonium lactate 12% topical lotion Qday to the rashes of the hands.\\nclobetasol 0.05 % scalp solution: Apply a few scattered drops to the scalp QHS PRN flare (patient never stopped)\\nOtezla: Take 30mg po BID
Discontinue Regimen: triamcinolone acetonide 0.1 % lotion: Apply a few scattered drops QHS to AA of scalp prn flare (Patient prefers the clobetasol)

## 2022-06-16 NOTE — PROCEDURE: HIGH RISK MEDICATION MONITORING
27-Jun-2019 Xeljanz Counseling: I discussed with the patient the risks of Xeljanz therapy including increased risk of infection, liver issues, headache, diarrhea, or cold symptoms. Live vaccines should be avoided. They were instructed to call if they have any problems.

## 2022-08-11 ENCOUNTER — RX ONLY (OUTPATIENT)
Age: 32
Setting detail: RX ONLY
End: 2022-08-11

## 2022-08-11 RX ORDER — SPIRONOLACTONE 50 MG/1
TABLET, FILM COATED ORAL TID
Qty: 90 | Refills: 3 | Status: ERX

## 2022-08-11 RX ORDER — APREMILAST 30 MG/1
TABLET, FILM COATED ORAL BID
Qty: 180 | Refills: 3 | Status: ERX

## 2022-10-24 ENCOUNTER — APPOINTMENT (RX ONLY)
Dept: URBAN - METROPOLITAN AREA CLINIC 374 | Facility: CLINIC | Age: 32
Setting detail: DERMATOLOGY
End: 2022-10-24

## 2022-10-24 DIAGNOSIS — L70.0 ACNE VULGARIS: ICD-10-CM | Status: WELL CONTROLLED

## 2022-10-24 DIAGNOSIS — L40.0 PSORIASIS VULGARIS: ICD-10-CM | Status: WELL CONTROLLED

## 2022-10-24 PROCEDURE — ? OTEZLA MONITORING

## 2022-10-24 PROCEDURE — ? PRESCRIPTION

## 2022-10-24 PROCEDURE — ? PRESCRIPTION MEDICATION MANAGEMENT

## 2022-10-24 PROCEDURE — 99214 OFFICE O/P EST MOD 30 MIN: CPT

## 2022-10-24 PROCEDURE — ? TREATMENT REGIMEN

## 2022-10-24 RX ORDER — CLASCOTERONE 1 G/100G
1 CREAM TOPICAL QAM
Qty: 60 | Refills: 3 | Status: ERX | COMMUNITY
Start: 2022-10-24

## 2022-10-24 RX ORDER — SPIRONOLACTONE 50 MG/1
1 TABLET, FILM COATED ORAL TID
Qty: 90 | Refills: 3 | Status: ERX | COMMUNITY
Start: 2022-10-24

## 2022-10-24 RX ADMIN — CLASCOTERONE 1: 1 CREAM TOPICAL at 00:00

## 2022-10-24 RX ADMIN — SPIRONOLACTONE 3: 50 TABLET, FILM COATED ORAL at 00:00

## 2022-10-24 ASSESSMENT — LOCATION SIMPLE DESCRIPTION DERM
LOCATION SIMPLE: LEFT CHEEK
LOCATION SIMPLE: RIGHT CHEEK
LOCATION SIMPLE: SCALP

## 2022-10-24 ASSESSMENT — LOCATION DETAILED DESCRIPTION DERM
LOCATION DETAILED: RIGHT CENTRAL PARIETAL SCALP
LOCATION DETAILED: LEFT SUPERIOR PARIETAL SCALP
LOCATION DETAILED: RIGHT INFERIOR CENTRAL MALAR CHEEK
LOCATION DETAILED: LEFT INFERIOR CENTRAL MALAR CHEEK

## 2022-10-24 ASSESSMENT — LOCATION ZONE DERM
LOCATION ZONE: SCALP
LOCATION ZONE: FACE

## 2022-10-24 NOTE — PROCEDURE: PRESCRIPTION MEDICATION MANAGEMENT
Render In Strict Bullet Format?: No
Continue Regimen: spironolactone 50 mg tablet: Take one tab po TID\\nWinlevi 1 % topical cream: Apply a thin layer to face QAM\\nRosadan 0.75 % topical cream: Apply thin layer to face qhs (Patient using PRN)
Detail Level: Zone
Continue Regimen: ketoconazole 2 % shampoo: Use each time wash hair, lather onto scalp, let sit for 3-5 minutes then rinse well qday\\nEucrisa 2 % topical ointment: Apply to hands bid\\nAmmonium lactate 12% topical lotion Qday to the rashes of the hands.\\nclobetasol 0.05 % scalp solution: Apply a few scattered drops to the scalp QHS PRN flare (patient never stopped)
Discontinue Regimen: Otezla: Take 30mg po BID (Will hold on Otezla until January)

## 2023-02-17 ENCOUNTER — APPOINTMENT (RX ONLY)
Dept: URBAN - METROPOLITAN AREA CLINIC 374 | Facility: CLINIC | Age: 33
Setting detail: DERMATOLOGY
End: 2023-02-17

## 2023-02-17 DIAGNOSIS — L40.0 PSORIASIS VULGARIS: ICD-10-CM | Status: WORSENING

## 2023-02-17 DIAGNOSIS — Z79.899 OTHER LONG TERM (CURRENT) DRUG THERAPY: ICD-10-CM

## 2023-02-17 DIAGNOSIS — L70.0 ACNE VULGARIS: ICD-10-CM

## 2023-02-17 PROCEDURE — ? INTENSIVE DRUG MONITORING FOR TOXICITY

## 2023-02-17 PROCEDURE — 99214 OFFICE O/P EST MOD 30 MIN: CPT

## 2023-02-17 PROCEDURE — ? PRESCRIPTION MEDICATION MANAGEMENT

## 2023-02-17 PROCEDURE — ? PRESCRIPTION

## 2023-02-17 PROCEDURE — ? DMARD INITIATION

## 2023-02-17 PROCEDURE — ? TREATMENT REGIMEN

## 2023-02-17 PROCEDURE — ? COUNSELING

## 2023-02-17 RX ORDER — SPIRONOLACTONE 50 MG/1
1 TABLET, FILM COATED ORAL TID
Qty: 270 | Refills: 3 | Status: ERX

## 2023-02-17 RX ORDER — CLASCOTERONE 1 G/100G
1 CREAM TOPICAL QAM
Qty: 60 | Refills: 3 | Status: ERX

## 2023-02-17 ASSESSMENT — LOCATION SIMPLE DESCRIPTION DERM
LOCATION SIMPLE: LEFT HAND
LOCATION SIMPLE: RIGHT CHEEK
LOCATION SIMPLE: RIGHT KNEE
LOCATION SIMPLE: RIGHT HAND
LOCATION SIMPLE: SCALP
LOCATION SIMPLE: LEFT CHEEK
LOCATION SIMPLE: LEFT KNEE
LOCATION SIMPLE: RIGHT FOREARM
LOCATION SIMPLE: LEFT FOREARM

## 2023-02-17 ASSESSMENT — LOCATION ZONE DERM
LOCATION ZONE: SCALP
LOCATION ZONE: ARM
LOCATION ZONE: HAND
LOCATION ZONE: LEG
LOCATION ZONE: FACE

## 2023-02-17 ASSESSMENT — BSA PSORIASIS: % BODY COVERED IN PSORIASIS: 10

## 2023-02-17 ASSESSMENT — LOCATION DETAILED DESCRIPTION DERM
LOCATION DETAILED: LEFT PROXIMAL DORSAL FOREARM
LOCATION DETAILED: LEFT KNEE
LOCATION DETAILED: RIGHT INFERIOR CENTRAL MALAR CHEEK
LOCATION DETAILED: RIGHT KNEE
LOCATION DETAILED: RIGHT CENTRAL PARIETAL SCALP
LOCATION DETAILED: RIGHT PROXIMAL DORSAL FOREARM
LOCATION DETAILED: LEFT SUPERIOR PARIETAL SCALP
LOCATION DETAILED: LEFT ULNAR DORSAL HAND
LOCATION DETAILED: RIGHT RADIAL DORSAL HAND
LOCATION DETAILED: LEFT INFERIOR CENTRAL MALAR CHEEK

## 2023-02-17 ASSESSMENT — ITCH NUMERIC RATING SCALE: HOW SEVERE IS YOUR ITCHING?: 8

## 2023-02-17 ASSESSMENT — PGA PSORIASIS: PGA PSORIASIS 2020: MODERATE

## 2023-02-17 NOTE — PROCEDURE: HIGH RISK MEDICATION MONITORING
NASAL CONGESTION Clofazimine Counseling:  I discussed with the patient the risks of clofazimine including but not limited to skin and eye pigmentation, liver damage, nausea/vomiting, gastrointestinal bleeding and allergy.

## 2023-02-17 NOTE — PROCEDURE: PRESCRIPTION MEDICATION MANAGEMENT
Render In Strict Bullet Format?: No
Continue Regimen: spironolactone 50 mg tablet: Take one tab po TID\\nWinlevi 1 % topical cream: Apply a thin layer to face QAM\\nRosadan 0.75 % topical cream: Apply thin layer to face qhs (Patient using PRN)
Detail Level: Zone
Plan: Will submit paperwork to new insurance for approval of Otezla
Continue Regimen: ketoconazole 2 % shampoo: Use each time wash hair, lather onto scalp, let sit for 3-5 minutes then rinse well qday\\nEucrisa 2 % topical ointment: Apply to hands bid\\nAmmonium lactate 12% topical lotion Qday to the rashes of the hands.\\nclobetasol 0.05 % scalp solution: Apply a few scattered drops to the scalp QHS PRN flare (patient never stopped)

## 2023-02-17 NOTE — PROCEDURE: HIGH RISK MEDICATION MONITORING
Needs f/u OV with me w/in the next week   Sotyktu Pregnancy And Lactation Text: There is insufficient data to evaluate whether or not Sotyktu is safe to use during pregnancy.   It is not known if Sotyktu passes into breast milk and whether or not it is safe to use when breastfeeding.

## 2023-02-17 NOTE — PROCEDURE: INTENSIVE DRUG MONITORING FOR TOXICITY
Drug Being Monitored: Otezla
Explanation (Does Not Render In The Note): A drug that requires intensive monitoring is a therapeutic agent that has the potential to cause serious morbidity or death. The monitoring is performed for assessment of these adverse effects and not primarily for assessment of therapeutic efficacy. The monitoring should be that which is generally accepted practice for the agent, but may be patient specific in some cases. Monitoring by history or examination does not qualify. Examples of monitoring that does not qualify include monitoring glucose levels during insulin therapy as the primary reason is the therapeutic effect or annual electrolytes and renal function for a patient on a diuretic as the frequency does not meet the threshold.

## 2023-02-17 NOTE — PROCEDURE: DMARD INITIATION
Stelara Monitoring Guidelines: A yearly test for tuberculosis is required while taking Stelara.
Mycophenolate Mofetil Dosing: 500mg twice daily
Xeljanz Dosing Override: 15 mg PO once daily
Humira Monitoring Guidelines: A yearly test for tuberculosis is required while taking Humira.
Stelara Dosing: 45mg SC at week 0 and 4 and then every 12 weeks thereafter
Taltz Monitoring Guidelines: A yearly test for tuberculosis is required while taking Taltz.
Humira Dosing: 80 mg SC day 0, 40 mg SC day 7, then 40 mg SC every other week
Ilumya Monitoring Guidelines: A yearly test for tuberculosis is required while taking Ilumya.
Xeljanz Dosing: 5 mg taken orally twice daily
Tremfya Monitoring Guidelines: A yearly test for tuberculosis is required while taking Tremfya.
Remicade Dosing: 5mg/kg at week 0,2 and 6
Detail Level: Simple
Cyclosporine Dosing: 2 to 5 mg/kg/day divided BID. The medication should be taken with a glass of water after breakfast and dinner.
Imuran Monitoring Guidelines: The patient should be evaluated monthly for the first 3 months and then once every three months after that. Labs should be drawn twice weekly for the first 2 months and then every 3 months after that. Labs include: CBC and LFTs.
Methotrexate Dosing: 12.5mg taken once weekly
Pregnancy Warning Text: This medication is not considered safe during pregnancy and precaution should be taken to avoid conception.
Is Soriatane Contraindicated?: No
Skyrizi Dosing: 150 mg SC week 0 and week 4 then every 12 weeks thereafter
Xeljanz Monitoring Guidelines: The patient should be seen regularly while taking Rinvoq. A CBC and Lipid panel should be checked 4 to 8 weeks after starting therapy and then every 3 months after that.
Lactation Warning Text: This medication is excreted in breast milk.
Dmard (Required): Otezla
Methotrexate Monitoring Guidelines: The patient should be evaluated monthly for the first 3 months and then once every three months after that. Labs should be drawn 1 week after the first dose and then weekly for the first month. Labs should also be drawn 1-2 after raising the dose and before further dose escalations. Eventually labs should be drawn approximately once a quarter. Labs include CBC, LFTs, creatinine, and BUN.
Include Lab And Follow-Up Information In The Note?: Yes
Enbrel Dosing: 50 mg SC twice weekly for 3 months then once a week there after
Xolair Monitoring Guidelines: The patient should be monitored during dosing for anaphylaxis.
Tremfya Dosing: 100 mg SC week 0 and week 4 then every 8 weeks thereafter
Cimzia Monitoring Guidelines: A yearly test for tuberculosis is required while taking Cimzia.
Mycophenolate Mofetil Monitoring Guidelines: The patient should be seen frequently at the start of therapy and then every 6 months when stable. A CBC, including platelet count, should be drawn weekly for the first 2-3 months. Starting in the fourth month the CBC should be drawn once a month for the first year. LFTs should be drawn after one month and then once a quarter.
Simponi Monitoring Guidelines: A yearly test for tuberculosis is required while taking Simponi.
Plaquenil Dosing: 200mg daily
Dupixent Dosing: 600 mg SC day 0 then 300 mg SC every other week
Imuran Dosing: 50mg twice daily
Otezla Monitoring Guidelines: The patient should be monitored regular for depression and weight loss while taking Otezla. BUN and creatinine should be monitored on a regular basis.
Cosentyx Monitoring Guidelines: A yearly test for tuberculosis is required while taking Cosentyx.
Siliq Monitoring Guidelines: A yearly test for tuberculosis is required while taking Siliq.
Diagnosis (Required): Psoriasis
Plaquenil Monitoring Guidelines: The patient should be seen regularly while taking Plaquenil. The patient should have their visual field evaluated every year for signs of retinopathy. A CBC should be ordered monthly for the first 3 months and then every 4-6 months after that.
Cimzia Dosing: 400 mg SC every other week
Simponi Dosing: 50 mg SC every month
Cyclosporine Monitoring Guidelines: The patient should be reevaluated in person every two weeks for the first 1-2 months and then monthly. Labs should follow a similar schedule and include: creatinine, BUN, CBC, LFTs, Lipids, magnesium, potassium, and uric acid. Blood pressure should be monitored at every visit as well.
Taltz Dosing: 160mg SC x 1 at weeks 0 then 80mg SC at weeks 2, 4, 6, 8, 10 and 12 then 80mg SC every four weeks
Otezla Dosing: 10mg qam day 1, 10mg BID day 2, 10mg qam and 20mg day 3, 20mg BID day 4, 20mg qam and 30mg qpm day 5, and 30mg BID on day 6 and thereafter
Remicade Monitoring Guidelines: A yearly test for tuberculosis is required while taking Remicade. A CBC should be ordered every 3-4 months on an ongoing basis while receiving infusions.
Dupixent Monitoring Guidelines: A yearly test for tuberculosis is required while taking Dupixent.\\nPatient will be continuously monitored for parasitic infections.
Ilumya Dosing: 100 mg SC week 0 and week 4 then every 12 weeks thereafter
Skyrizi Monitoring Guidelines: A yearly test for tuberculosis is required while taking Skyrizi.
Xolair Dosing: 300mg SC every 4 weeks
Enbrel Monitoring Guidelines: A yearly test for tuberculosis is required while taking Enbrel.
Cosentyx Dosing: 300 mg SC week 0, 1, 2, 3, and 4 then every 4 weeks after that
Siliq Dosing: 210 mg SC at week 0,1 and 2 and then every 2 weeks thereafter

## 2023-03-01 ENCOUNTER — RX ONLY (OUTPATIENT)
Age: 33
Setting detail: RX ONLY
End: 2023-03-01

## 2023-03-01 RX ORDER — DAPSONE 75 MG/G
1 GEL TOPICAL QAM
Qty: 90 | Refills: 2 | Status: ERX | COMMUNITY
Start: 2023-03-01

## 2023-07-03 ENCOUNTER — APPOINTMENT (RX ONLY)
Dept: URBAN - METROPOLITAN AREA CLINIC 26 | Facility: CLINIC | Age: 33
Setting detail: DERMATOLOGY
End: 2023-07-03

## 2023-07-03 DIAGNOSIS — D22 MELANOCYTIC NEVI: ICD-10-CM

## 2023-07-03 DIAGNOSIS — L81.4 OTHER MELANIN HYPERPIGMENTATION: ICD-10-CM

## 2023-07-03 DIAGNOSIS — L82.1 OTHER SEBORRHEIC KERATOSIS: ICD-10-CM

## 2023-07-03 DIAGNOSIS — L70.0 ACNE VULGARIS: ICD-10-CM | Status: INADEQUATELY CONTROLLED

## 2023-07-03 PROBLEM — D22.5 MELANOCYTIC NEVI OF TRUNK: Status: ACTIVE | Noted: 2023-07-03

## 2023-07-03 PROBLEM — D23.72 OTHER BENIGN NEOPLASM OF SKIN OF LEFT LOWER LIMB, INCLUDING HIP: Status: ACTIVE | Noted: 2023-07-03

## 2023-07-03 PROCEDURE — ? PRESCRIPTION

## 2023-07-03 PROCEDURE — ? PRESCRIPTION MEDICATION MANAGEMENT

## 2023-07-03 PROCEDURE — ? SUNSCREEN RECOMMENDATIONS

## 2023-07-03 PROCEDURE — ? FULL BODY SKIN EXAM

## 2023-07-03 PROCEDURE — ? COUNSELING

## 2023-07-03 PROCEDURE — 99214 OFFICE O/P EST MOD 30 MIN: CPT

## 2023-07-03 RX ORDER — TRETIONIN 0.25 MG/G
CREAM TOPICAL
Qty: 45 | Refills: 6 | Status: ERX | COMMUNITY
Start: 2023-07-03

## 2023-07-03 RX ADMIN — TRETIONIN 1: 0.25 CREAM TOPICAL at 00:00

## 2023-07-03 ASSESSMENT — LOCATION SIMPLE DESCRIPTION DERM
LOCATION SIMPLE: RIGHT FOREHEAD
LOCATION SIMPLE: UPPER BACK
LOCATION SIMPLE: RIGHT POSTERIOR UPPER ARM

## 2023-07-03 ASSESSMENT — LOCATION DETAILED DESCRIPTION DERM
LOCATION DETAILED: RIGHT DISTAL POSTERIOR UPPER ARM
LOCATION DETAILED: RIGHT INFERIOR MEDIAL FOREHEAD
LOCATION DETAILED: SUPERIOR THORACIC SPINE

## 2023-07-03 ASSESSMENT — SEVERITY ASSESSMENT OVERALL AMONG ALL PATIENTS
IN YOUR EXPERIENCE, AMONG ALL PATIENTS YOU HAVE SEEN WITH THIS CONDITION, HOW SEVERE IS THIS PATIENT'S CONDITION?: FEW INFLAMMATORY LESIONS, SOME NONINFLAMMATORY

## 2023-07-03 ASSESSMENT — LOCATION ZONE DERM
LOCATION ZONE: FACE
LOCATION ZONE: TRUNK
LOCATION ZONE: ARM

## 2023-07-03 NOTE — PROCEDURE: COUNSELING
Detail Level: Generalized
Doxycycline Counseling:  Patient counseled regarding possible photosensitivity and increased risk for sunburn.  Patient instructed to avoid sunlight, if possible.  When exposed to sunlight, patients should wear protective clothing, sunglasses, and sunscreen.  The patient was instructed to call the office immediately if the following severe adverse effects occur:  hearing changes, easy bruising/bleeding, severe headache, or vision changes.  The patient verbalized understanding of the proper use and possible adverse effects of doxycycline.  All of the patient's questions and concerns were addressed.
Azithromycin Pregnancy And Lactation Text: This medication is considered safe during pregnancy and is also secreted in breast milk.
Include Pregnancy/Lactation Warning?: No
Topical Retinoid Pregnancy And Lactation Text: This medication is Pregnancy Category C. It is unknown if this medication is excreted in breast milk.
Topical Sulfur Applications Counseling: Topical Sulfur Counseling: Patient counseled that this medication may cause skin irritation or allergic reactions.  In the event of skin irritation, the patient was advised to reduce the amount of the drug applied or use it less frequently.   The patient verbalized understanding of the proper use and possible adverse effects of topical sulfur application.  All of the patient's questions and concerns were addressed.
High Dose Vitamin A Pregnancy And Lactation Text: High dose vitamin A therapy is contraindicated during pregnancy and breast feeding.
Tetracycline Counseling: Patient counseled regarding possible photosensitivity and increased risk for sunburn.  Patient instructed to avoid sunlight, if possible.  When exposed to sunlight, patients should wear protective clothing, sunglasses, and sunscreen.  The patient was instructed to call the office immediately if the following severe adverse effects occur:  hearing changes, easy bruising/bleeding, severe headache, or vision changes.  The patient verbalized understanding of the proper use and possible adverse effects of tetracycline.  All of the patient's questions and concerns were addressed. Patient understands to avoid pregnancy while on therapy due to potential birth defects.
Benzoyl Peroxide Pregnancy And Lactation Text: This medication is Pregnancy Category C. It is unknown if benzoyl peroxide is excreted in breast milk.
Dapsone Counseling: I discussed with the patient the risks of dapsone including but not limited to hemolytic anemia, agranulocytosis, rashes, methemoglobinemia, kidney failure, peripheral neuropathy, headaches, GI upset, and liver toxicity.  Patients who start dapsone require monitoring including baseline LFTs and weekly CBCs for the first month, then every month thereafter.  The patient verbalized understanding of the proper use and possible adverse effects of dapsone.  All of the patient's questions and concerns were addressed.
Isotretinoin Pregnancy And Lactation Text: This medication is Pregnancy Category X and is considered extremely dangerous during pregnancy. It is unknown if it is excreted in breast milk.
Azelaic Acid Pregnancy And Lactation Text: This medication is considered safe during pregnancy and breast feeding.
Topical Clindamycin Counseling: Patient counseled that this medication may cause skin irritation or allergic reactions.  In the event of skin irritation, the patient was advised to reduce the amount of the drug applied or use it less frequently.   The patient verbalized understanding of the proper use and possible adverse effects of clindamycin.  All of the patient's questions and concerns were addressed.
Spironolactone Counseling: Patient advised regarding risks of diarrhea, abdominal pain, hyperkalemia, birth defects (for female patients), liver toxicity and renal toxicity. The patient may need blood work to monitor liver and kidney function and potassium levels while on therapy. The patient verbalized understanding of the proper use and possible adverse effects of spironolactone.  All of the patient's questions and concerns were addressed.
Tazorac Counseling:  Patient advised that medication is irritating and drying.  Patient may need to apply sparingly and wash off after an hour before eventually leaving it on overnight.  The patient verbalized understanding of the proper use and possible adverse effects of tazorac.  All of the patient's questions and concerns were addressed.
Birth Control Pills Counseling: Birth Control Pill Counseling: I discussed with the patient the potential side effects of OCPs including but not limited to increased risk of stroke, heart attack, thrombophlebitis, deep venous thrombosis, hepatic adenomas, breast changes, GI upset, headaches, and depression.  The patient verbalized understanding of the proper use and possible adverse effects of OCPs. All of the patient's questions and concerns were addressed.
Erythromycin Pregnancy And Lactation Text: This medication is Pregnancy Category B and is considered safe during pregnancy. It is also excreted in breast milk.
Aklief Pregnancy And Lactation Text: It is unknown if this medication is safe to use during pregnancy.  It is unknown if this medication is excreted in breast milk.  Breastfeeding women should use the topical cream on the smallest area of the skin for the shortest time needed while breastfeeding.  Do not apply to nipple and areola.
Sarecycline Counseling: Patient advised regarding possible photosensitivity and discoloration of the teeth, skin, lips, tongue and gums.  Patient instructed to avoid sunlight, if possible.  When exposed to sunlight, patients should wear protective clothing, sunglasses, and sunscreen.  The patient was instructed to call the office immediately if the following severe adverse effects occur:  hearing changes, easy bruising/bleeding, severe headache, or vision changes.  The patient verbalized understanding of the proper use and possible adverse effects of sarecycline.  All of the patient's questions and concerns were addressed.
Bactrim Counseling:  I discussed with the patient the risks of sulfa antibiotics including but not limited to GI upset, allergic reaction, drug rash, diarrhea, dizziness, photosensitivity, and yeast infections.  Rarely, more serious reactions can occur including but not limited to aplastic anemia, agranulocytosis, methemoglobinemia, blood dyscrasias, liver or kidney failure, lung infiltrates or desquamative/blistering drug rashes.
Tetracycline Pregnancy And Lactation Text: This medication is Pregnancy Category D and not consider safe during pregnancy. It is also excreted in breast milk.
Minocycline Counseling: Patient advised regarding possible photosensitivity and discoloration of the teeth, skin, lips, tongue and gums.  Patient instructed to avoid sunlight, if possible.  When exposed to sunlight, patients should wear protective clothing, sunglasses, and sunscreen.  The patient was instructed to call the office immediately if the following severe adverse effects occur:  hearing changes, easy bruising/bleeding, severe headache, or vision changes.  The patient verbalized understanding of the proper use and possible adverse effects of minocycline.  All of the patient's questions and concerns were addressed.
Topical Retinoid counseling:  Patient advised to apply a pea-sized amount only at bedtime and wait 30 minutes after washing their face before applying.  If too drying, patient may add a non-comedogenic moisturizer. The patient verbalized understanding of the proper use and possible adverse effects of retinoids.  All of the patient's questions and concerns were addressed.
Winlevi Pregnancy And Lactation Text: This medication is considered safe during pregnancy and breastfeeding.
Topical Sulfur Applications Pregnancy And Lactation Text: This medication is Pregnancy Category C and has an unknown safety profile during pregnancy. It is unknown if this topical medication is excreted in breast milk.
Doxycycline Pregnancy And Lactation Text: This medication is Pregnancy Category D and not consider safe during pregnancy. It is also excreted in breast milk but is considered safe for shorter treatment courses.
Azithromycin Counseling:  I discussed with the patient the risks of azithromycin including but not limited to GI upset, allergic reaction, drug rash, diarrhea, and yeast infections.
Dapsone Pregnancy And Lactation Text: This medication is Pregnancy Category C and is not considered safe during pregnancy or breast feeding.
Spironolactone Pregnancy And Lactation Text: This medication can cause feminization of the male fetus and should be avoided during pregnancy. The active metabolite is also found in breast milk.
High Dose Vitamin A Counseling: Side effects reviewed, pt to contact office should one occur.
Benzoyl Peroxide Counseling: Patient counseled that medicine may cause skin irritation and bleach clothing.  In the event of skin irritation, the patient was advised to reduce the amount of the drug applied or use it less frequently.   The patient verbalized understanding of the proper use and possible adverse effects of benzoyl peroxide.  All of the patient's questions and concerns were addressed.
Detail Level: Zone
Azelaic Acid Counseling: Patient counseled that medicine may cause skin irritation and to avoid applying near the eyes.  In the event of skin irritation, the patient was advised to reduce the amount of the drug applied or use it less frequently.   The patient verbalized understanding of the proper use and possible adverse effects of azelaic acid.  All of the patient's questions and concerns were addressed.
Birth Control Pills Pregnancy And Lactation Text: This medication should be avoided if pregnant and for the first 30 days post-partum.
Isotretinoin Counseling: Patient should get monthly blood tests, not donate blood, not drive at night if vision affected, not share medication, and not undergo elective surgery for 6 months after tx completed. Side effects reviewed, pt to contact office should one occur.
Topical Clindamycin Pregnancy And Lactation Text: This medication is Pregnancy Category B and is considered safe during pregnancy. It is unknown if it is excreted in breast milk.
Tazorac Pregnancy And Lactation Text: This medication is not safe during pregnancy. It is unknown if this medication is excreted in breast milk.
Winlevi Counseling:  I discussed with the patient the risks of topical clascoterone including but not limited to erythema, scaling, itching, and stinging. Patient voiced their understanding.
Aklief counseling:  Patient advised to apply a pea-sized amount only at bedtime and wait 30 minutes after washing their face before applying.  If too drying, patient may add a non-comedogenic moisturizer.  The most commonly reported side effects including irritation, redness, scaling, dryness, stinging, burning, itching, and increased risk of sunburn.  The patient verbalized understanding of the proper use and possible adverse effects of retinoids.  All of the patient's questions and concerns were addressed.
Bactrim Pregnancy And Lactation Text: This medication is Pregnancy Category D and is known to cause fetal risk.  It is also excreted in breast milk.
Erythromycin Counseling:  I discussed with the patient the risks of erythromycin including but not limited to GI upset, allergic reaction, drug rash, diarrhea, increase in liver enzymes, and yeast infections.

## 2023-07-03 NOTE — PROCEDURE: PRESCRIPTION MEDICATION MANAGEMENT
Render In Strict Bullet Format?: No
Detail Level: Zone
Initiate Treatment: tretinoin 0.025 % topical cream; Apply a pea sized amount to entire face every other night, followed by gentle moisturizer. May increase to QHS as tolerated.
Continue Regimen: spironolactone 50 mg tablet: Take one tab po TID\\nWinlevi 1 % topical cream: Apply a thin layer to face QAM\\nRosadan 0.75 % topical cream: Apply thin layer to face qhs (Patient using PRN)

## 2023-08-22 NOTE — PROCEDURE: COUNSELING
Spironolactone Pregnancy And Lactation Text: This medication can cause feminization of the male fetus and should be avoided during pregnancy. The active metabolite is also found in breast milk.
Topical Sulfur Applications Counseling: Topical Sulfur Counseling: Patient counseled that this medication may cause skin irritation or allergic reactions.  In the event of skin irritation, the patient was advised to reduce the amount of the drug applied or use it less frequently.   The patient verbalized understanding of the proper use and possible adverse effects of topical sulfur application.  All of the patient's questions and concerns were addressed.
High Dose Vitamin A Counseling: Side effects reviewed, pt to contact office should one occur.
Azithromycin Counseling:  I discussed with the patient the risks of azithromycin including but not limited to GI upset, allergic reaction, drug rash, diarrhea, and yeast infections.
Minocycline Pregnancy And Lactation Text: This medication is Pregnancy Category D and not consider safe during pregnancy. It is also excreted in breast milk.
Tazorac Counseling:  Patient advised that medication is irritating and drying.  Patient may need to apply sparingly and wash off after an hour before eventually leaving it on overnight.  The patient verbalized understanding of the proper use and possible adverse effects of tazorac.  All of the patient's questions and concerns were addressed.
Erythromycin Counseling:  I discussed with the patient the risks of erythromycin including but not limited to GI upset, allergic reaction, drug rash, diarrhea, increase in liver enzymes, and yeast infections.
Bactrim Pregnancy And Lactation Text: This medication is Pregnancy Category D and is known to cause fetal risk.  It is also excreted in breast milk.
Erythromycin Pregnancy And Lactation Text: This medication is Pregnancy Category B and is considered safe during pregnancy. It is also excreted in breast milk.
Tazorac Pregnancy And Lactation Text: This medication is not safe during pregnancy. It is unknown if this medication is excreted in breast milk.
Birth Control Pills Counseling: Birth Control Pill Counseling: I discussed with the patient the potential side effects of OCPs including but not limited to increased risk of stroke, heart attack, thrombophlebitis, deep venous thrombosis, hepatic adenomas, breast changes, GI upset, headaches, and depression.  The patient verbalized understanding of the proper use and possible adverse effects of OCPs. All of the patient's questions and concerns were addressed.
High Dose Vitamin A Pregnancy And Lactation Text: High dose vitamin A therapy is contraindicated during pregnancy and breast feeding.
Tetracycline Counseling: Patient counseled regarding possible photosensitivity and increased risk for sunburn.  Patient instructed to avoid sunlight, if possible.  When exposed to sunlight, patients should wear protective clothing, sunglasses, and sunscreen.  The patient was instructed to call the office immediately if the following severe adverse effects occur:  hearing changes, easy bruising/bleeding, severe headache, or vision changes.  The patient verbalized understanding of the proper use and possible adverse effects of tetracycline.  All of the patient's questions and concerns were addressed. Patient understands to avoid pregnancy while on therapy due to potential birth defects.
Doxycycline Counseling:  Patient counseled regarding possible photosensitivity and increased risk for sunburn.  Patient instructed to avoid sunlight, if possible.  When exposed to sunlight, patients should wear protective clothing, sunglasses, and sunscreen.  The patient was instructed to call the office immediately if the following severe adverse effects occur:  hearing changes, easy bruising/bleeding, severe headache, or vision changes.  The patient verbalized understanding of the proper use and possible adverse effects of doxycycline.  All of the patient's questions and concerns were addressed.
Dapsone Pregnancy And Lactation Text: This medication is Pregnancy Category C and is not considered safe during pregnancy or breast feeding.
Benzoyl Peroxide Pregnancy And Lactation Text: This medication is Pregnancy Category C. It is unknown if benzoyl peroxide is excreted in breast milk.
Sarecycline Counseling: Patient advised regarding possible photosensitivity and discoloration of the teeth, skin, lips, tongue and gums.  Patient instructed to avoid sunlight, if possible.  When exposed to sunlight, patients should wear protective clothing, sunglasses, and sunscreen.  The patient was instructed to call the office immediately if the following severe adverse effects occur:  hearing changes, easy bruising/bleeding, severe headache, or vision changes.  The patient verbalized understanding of the proper use and possible adverse effects of sarecycline.  All of the patient's questions and concerns were addressed.
Use Enhanced Medication Counseling?: No
Azithromycin Pregnancy And Lactation Text: This medication is considered safe during pregnancy and is also secreted in breast milk.
Minocycline Counseling: Patient advised regarding possible photosensitivity and discoloration of the teeth, skin, lips, tongue and gums.  Patient instructed to avoid sunlight, if possible.  When exposed to sunlight, patients should wear protective clothing, sunglasses, and sunscreen.  The patient was instructed to call the office immediately if the following severe adverse effects occur:  hearing changes, easy bruising/bleeding, severe headache, or vision changes.  The patient verbalized understanding of the proper use and possible adverse effects of minocycline.  All of the patient's questions and concerns were addressed.
Topical Sulfur Applications Pregnancy And Lactation Text: This medication is Pregnancy Category C and has an unknown safety profile during pregnancy. It is unknown if this topical medication is excreted in breast milk.
Topical Retinoid counseling:  Patient advised to apply a pea-sized amount only at bedtime and wait 30 minutes after washing their face before applying.  If too drying, patient may add a non-comedogenic moisturizer. The patient verbalized understanding of the proper use and possible adverse effects of retinoids.  All of the patient's questions and concerns were addressed.
Topical Clindamycin Counseling: Patient counseled that this medication may cause skin irritation or allergic reactions.  In the event of skin irritation, the patient was advised to reduce the amount of the drug applied or use it less frequently.   The patient verbalized understanding of the proper use and possible adverse effects of clindamycin.  All of the patient's questions and concerns were addressed.
Birth Control Pills Pregnancy And Lactation Text: This medication should be avoided if pregnant and for the first 30 days post-partum.
Isotretinoin Counseling: Patient should get monthly blood tests, not donate blood, not drive at night if vision affected, not share medication, and not undergo elective surgery for 6 months after tx completed. Side effects reviewed, pt to contact office should one occur.
Topical Clindamycin Pregnancy And Lactation Text: This medication is Pregnancy Category B and is considered safe during pregnancy. It is unknown if it is excreted in breast milk.
Isotretinoin Pregnancy And Lactation Text: This medication is Pregnancy Category X and is considered extremely dangerous during pregnancy. It is unknown if it is excreted in breast milk.
Bactrim Counseling:  I discussed with the patient the risks of sulfa antibiotics including but not limited to GI upset, allergic reaction, drug rash, diarrhea, dizziness, photosensitivity, and yeast infections.  Rarely, more serious reactions can occur including but not limited to aplastic anemia, agranulocytosis, methemoglobinemia, blood dyscrasias, liver or kidney failure, lung infiltrates or desquamative/blistering drug rashes.
Benzoyl Peroxide Counseling: Patient counseled that medicine may cause skin irritation and bleach clothing.  In the event of skin irritation, the patient was advised to reduce the amount of the drug applied or use it less frequently.   The patient verbalized understanding of the proper use and possible adverse effects of benzoyl peroxide.  All of the patient's questions and concerns were addressed.
Topical Retinoid Pregnancy And Lactation Text: This medication is Pregnancy Category C. It is unknown if this medication is excreted in breast milk.
Doxycycline Pregnancy And Lactation Text: This medication is Pregnancy Category D and not consider safe during pregnancy. It is also excreted in breast milk but is considered safe for shorter treatment courses.
Dapsone Counseling: I discussed with the patient the risks of dapsone including but not limited to hemolytic anemia, agranulocytosis, rashes, methemoglobinemia, kidney failure, peripheral neuropathy, headaches, GI upset, and liver toxicity.  Patients who start dapsone require monitoring including baseline LFTs and weekly CBCs for the first month, then every month thereafter.  The patient verbalized understanding of the proper use and possible adverse effects of dapsone.  All of the patient's questions and concerns were addressed.
Detail Level: Detailed
Spironolactone Counseling: Patient advised regarding risks of diarrhea, abdominal pain, hyperkalemia, birth defects (for female patients), liver toxicity and renal toxicity. The patient may need blood work to monitor liver and kidney function and potassium levels while on therapy. The patient verbalized understanding of the proper use and possible adverse effects of spironolactone.  All of the patient's questions and concerns were addressed.
no enlarged lymph nodes/no tender lymph nodes/no swelling of extremity

## 2024-01-25 NOTE — PROCEDURE: HIGH RISK MEDICATION MONITORING
Established Office Visit      Patient Name: Joyce Jones  : 1946   MRN: [id number]   Care Team: Patient Care Team:  Gayle Low DO as PCP - General (Internal Medicine)  Hu Dewey MD as Consulting Physician (Pain Medicine)    Chief Complaint:    Chief Complaint   Patient presents with    Dizziness    Fatigue       History of Present Illness: Joyce Jones is a 77 y.o. female  with fibromyalgia, PMR (following with rheumatology), chronic low back pain with sciatica, HTN who is here today to follow up with chronic fatigue, dizziness.    Reports ongoing dizziness/fatigue/nausea sensation coming and going for >1 year but gradually worsening. When she extends/flexes/or twists her neck she feels nausea, generalized weakness, dizziness. Sometimes briefly has blurry vision with this. These symptoms quickly resolve when putting her head back into a neutral position. No neck pain. She has been prescribed steroids in the past which does help with her dizziness.    She has had carotid duplex which showed R <50% stenosis and L normal flow.   She has known history of vertigo and has upcoming appointment to establish with ENT      She is following with  NS and interventional pain for coccyodynia - planing for upcoming injection.       Subjective      Review of Systems:   Review of Systems - See HPI    I have reviewed and the following portions of the patient's history were updated as appropriate: past family history, past medical history, past social history, past surgical history and problem list.    Medications:     Current Outpatient Medications:     aspirin 81 MG EC tablet, Take 1 tablet by mouth Daily., Disp: 30 tablet, Rfl: 3    atorvastatin (LIPITOR) 20 MG tablet, Take 1 tablet by mouth Every Night., Disp: 30 tablet, Rfl: 3    cyclobenzaprine (FLEXERIL) 10 MG tablet, Take 1 tablet by mouth At Night As Needed for Muscle Spasms., Disp: 10 tablet, Rfl: 0    fluticasone (FLONASE) 50 MCG/ACT  "nasal spray, 1 spray into the nostril(s) as directed by provider Daily., Disp: 9.9 mL, Rfl: 11    lidocaine (LIDODERM) 5 %, Place 1 patch on the skin as directed by provider Daily. Remove & Discard patch within 12 hours or as directed by MD, Disp: 30 each, Rfl: 0    losartan (COZAAR) 50 MG tablet, Take 1 tablet by mouth Daily., Disp: , Rfl:     traMADol-acetaminophen (ULTRACET) 37.5-325 MG per tablet, Take 1 tablet by mouth Every 6 (Six) Hours As Needed for Moderate Pain., Disp: , Rfl:     predniSONE (DELTASONE) 10 MG tablet, Take 2 tablets by mouth Daily for 2 days, THEN 1 tablet Daily for 2 days, THEN 0.5 tablets Daily for 2 days., Disp: 7 tablet, Rfl: 0    Allergies:   Allergies   Allergen Reactions    Overton-2 Inhibitors Anaphylaxis    Nabumetone Anaphylaxis    Nsaids GI Intolerance and Hives    Penicillins Nausea And Vomiting and Dizziness    Sulfa Antibiotics Shortness Of Breath    Celecoxib Hives    Zithromax [Azithromycin] Dizziness       Objective     Physical Exam:  Vital Signs:   Vitals:    01/25/24 1035   BP: 110/72   Pulse: 71   SpO2: 99%   Weight: 63.5 kg (140 lb)   Height: 160 cm (62.99\")     Body mass index is 24.81 kg/m².     Physical Exam  Vitals reviewed.   Constitutional:       Appearance: Normal appearance.   HENT:      Right Ear: Tympanic membrane and ear canal normal.      Left Ear: Tympanic membrane and ear canal normal.      Ears:      Comments: +partially obstructive cerumen bilaterally   Cardiovascular:      Rate and Rhythm: Normal rate.   Pulmonary:      Effort: Pulmonary effort is normal. No respiratory distress.   Skin:     General: Skin is warm and dry.   Neurological:      Mental Status: She is alert.      Gait: Gait normal.   Psychiatric:         Mood and Affect: Mood normal.         Behavior: Behavior normal.         Judgment: Judgment normal.         Assessment / Plan      Assessment/Plan:   Problems Addressed This Visit  Diagnoses and all orders for this visit:    1. Benign " paroxysmal positional vertigo of right ear (Primary)  -     predniSONE (DELTASONE) 10 MG tablet; Take 2 tablets by mouth Daily for 2 days, THEN 1 tablet Daily for 2 days, THEN 0.5 tablets Daily for 2 days.  Dispense: 7 tablet; Refill: 0      Suspect symptoms may be related to BPPV given clinical presentation and improvement when taking steorids. Will provide short steroid burst as she awaits to establish with ENT next week.     US carotid 10/2023 - R <50% stenosis, L normal. She is following with cardiology and on ASA, statin.  MR Brain 2022 - mild cortical atrophy but no acute intracranial findings.     Plan of care reviewed with patient at the conclusion of today's visit. Education was provided regarding diagnosis and management.  Patient verbalizes understanding of and agreement with management plan.    Follow Up:   No follow-ups on file.        DO LANA Aguiar RD  [facility]  [address]  [facility] [address]  Fax [phone]  Phone [phone]       Nsaids Pregnancy And Lactation Text: These medications are considered safe up to 30 weeks gestation. It is excreted in breast milk.

## 2024-02-21 ENCOUNTER — RX ONLY (OUTPATIENT)
Age: 34
Setting detail: RX ONLY
End: 2024-02-21

## 2024-02-21 ENCOUNTER — APPOINTMENT (RX ONLY)
Dept: URBAN - METROPOLITAN AREA CLINIC 26 | Facility: CLINIC | Age: 34
Setting detail: DERMATOLOGY
End: 2024-02-21

## 2024-02-21 DIAGNOSIS — L72.0 EPIDERMAL CYST: ICD-10-CM

## 2024-02-21 DIAGNOSIS — L73.8 OTHER SPECIFIED FOLLICULAR DISORDERS: ICD-10-CM

## 2024-02-21 DIAGNOSIS — L40.0 PSORIASIS VULGARIS: ICD-10-CM

## 2024-02-21 DIAGNOSIS — L70.0 ACNE VULGARIS: ICD-10-CM | Status: WELL CONTROLLED

## 2024-02-21 PROCEDURE — ? PRESCRIPTION MEDICATION MANAGEMENT

## 2024-02-21 PROCEDURE — 99214 OFFICE O/P EST MOD 30 MIN: CPT

## 2024-02-21 PROCEDURE — ? COUNSELING

## 2024-02-21 RX ORDER — SPIRONOLACTONE 50 MG/1
1 TABLET, FILM COATED ORAL TID
Qty: 270 | Refills: 3 | Status: ERX

## 2024-02-21 RX ORDER — APREMILAST 30 MG/1
TABLET, FILM COATED ORAL BID
Qty: 180 | Refills: 3 | Status: ERX

## 2024-02-21 RX ORDER — TRETIONIN 0.25 MG/G
CREAM TOPICAL
Qty: 45 | Refills: 6 | Status: ERX

## 2024-02-21 ASSESSMENT — LOCATION SIMPLE DESCRIPTION DERM
LOCATION SIMPLE: LEFT FOREHEAD
LOCATION SIMPLE: RIGHT FOREHEAD
LOCATION SIMPLE: LEFT CHEEK
LOCATION SIMPLE: LEFT SCALP

## 2024-02-21 ASSESSMENT — LOCATION DETAILED DESCRIPTION DERM
LOCATION DETAILED: LEFT INFERIOR CENTRAL MALAR CHEEK
LOCATION DETAILED: RIGHT MEDIAL FOREHEAD
LOCATION DETAILED: LEFT MEDIAL FRONTAL SCALP
LOCATION DETAILED: LEFT INFERIOR FOREHEAD

## 2024-02-21 ASSESSMENT — LOCATION ZONE DERM
LOCATION ZONE: FACE
LOCATION ZONE: SCALP

## 2024-02-21 NOTE — PROCEDURE: COUNSELING
Detail Level: Zone
Sarecycline Counseling: Patient advised regarding possible photosensitivity and discoloration of the teeth, skin, lips, tongue and gums.  Patient instructed to avoid sunlight, if possible.  When exposed to sunlight, patients should wear protective clothing, sunglasses, and sunscreen.  The patient was instructed to call the office immediately if the following severe adverse effects occur:  hearing changes, easy bruising/bleeding, severe headache, or vision changes.  The patient verbalized understanding of the proper use and possible adverse effects of sarecycline.  All of the patient's questions and concerns were addressed.
Topical Retinoid Pregnancy And Lactation Text: This medication is Pregnancy Category C. It is unknown if this medication is excreted in breast milk.
Include Pregnancy/Lactation Warning?: No
Tetracycline Counseling: Patient counseled regarding possible photosensitivity and increased risk for sunburn.  Patient instructed to avoid sunlight, if possible.  When exposed to sunlight, patients should wear protective clothing, sunglasses, and sunscreen.  The patient was instructed to call the office immediately if the following severe adverse effects occur:  hearing changes, easy bruising/bleeding, severe headache, or vision changes.  The patient verbalized understanding of the proper use and possible adverse effects of tetracycline.  All of the patient's questions and concerns were addressed. Patient understands to avoid pregnancy while on therapy due to potential birth defects.
Birth Control Pills Pregnancy And Lactation Text: This medication should be avoided if pregnant and for the first 30 days post-partum.
Tetracycline Pregnancy And Lactation Text: This medication is Pregnancy Category D and not consider safe during pregnancy. It is also excreted in breast milk.
Azelaic Acid Pregnancy And Lactation Text: This medication is considered safe during pregnancy and breast feeding.
Azithromycin Pregnancy And Lactation Text: This medication is considered safe during pregnancy and is also secreted in breast milk.
Erythromycin Counseling:  I discussed with the patient the risks of erythromycin including but not limited to GI upset, allergic reaction, drug rash, diarrhea, increase in liver enzymes, and yeast infections.
High Dose Vitamin A Counseling: Side effects reviewed, pt to contact office should one occur.
Bactrim Counseling:  I discussed with the patient the risks of sulfa antibiotics including but not limited to GI upset, allergic reaction, drug rash, diarrhea, dizziness, photosensitivity, and yeast infections.  Rarely, more serious reactions can occur including but not limited to aplastic anemia, agranulocytosis, methemoglobinemia, blood dyscrasias, liver or kidney failure, lung infiltrates or desquamative/blistering drug rashes.
Tazorac Counseling:  Patient advised that medication is irritating and drying.  Patient may need to apply sparingly and wash off after an hour before eventually leaving it on overnight.  The patient verbalized understanding of the proper use and possible adverse effects of tazorac.  All of the patient's questions and concerns were addressed.
High Dose Vitamin A Pregnancy And Lactation Text: High dose vitamin A therapy is contraindicated during pregnancy and breast feeding.
Dapsone Counseling: I discussed with the patient the risks of dapsone including but not limited to hemolytic anemia, agranulocytosis, rashes, methemoglobinemia, kidney failure, peripheral neuropathy, headaches, GI upset, and liver toxicity.  Patients who start dapsone require monitoring including baseline LFTs and weekly CBCs for the first month, then every month thereafter.  The patient verbalized understanding of the proper use and possible adverse effects of dapsone.  All of the patient's questions and concerns were addressed.
Topical Sulfur Applications Counseling: Topical Sulfur Counseling: Patient counseled that this medication may cause skin irritation or allergic reactions.  In the event of skin irritation, the patient was advised to reduce the amount of the drug applied or use it less frequently.   The patient verbalized understanding of the proper use and possible adverse effects of topical sulfur application.  All of the patient's questions and concerns were addressed.
Topical Sulfur Applications Pregnancy And Lactation Text: This medication is Pregnancy Category C and has an unknown safety profile during pregnancy. It is unknown if this topical medication is excreted in breast milk.
Erythromycin Pregnancy And Lactation Text: This medication is Pregnancy Category B and is considered safe during pregnancy. It is also excreted in breast milk.
Aklief counseling:  Patient advised to apply a pea-sized amount only at bedtime and wait 30 minutes after washing their face before applying.  If too drying, patient may add a non-comedogenic moisturizer.  The most commonly reported side effects including irritation, redness, scaling, dryness, stinging, burning, itching, and increased risk of sunburn.  The patient verbalized understanding of the proper use and possible adverse effects of retinoids.  All of the patient's questions and concerns were addressed.
Benzoyl Peroxide Counseling: Patient counseled that medicine may cause skin irritation and bleach clothing.  In the event of skin irritation, the patient was advised to reduce the amount of the drug applied or use it less frequently.   The patient verbalized understanding of the proper use and possible adverse effects of benzoyl peroxide.  All of the patient's questions and concerns were addressed.
Bactrim Pregnancy And Lactation Text: This medication is Pregnancy Category D and is known to cause fetal risk.  It is also excreted in breast milk.
Minocycline Counseling: Patient advised regarding possible photosensitivity and discoloration of the teeth, skin, lips, tongue and gums.  Patient instructed to avoid sunlight, if possible.  When exposed to sunlight, patients should wear protective clothing, sunglasses, and sunscreen.  The patient was instructed to call the office immediately if the following severe adverse effects occur:  hearing changes, easy bruising/bleeding, severe headache, or vision changes.  The patient verbalized understanding of the proper use and possible adverse effects of minocycline.  All of the patient's questions and concerns were addressed.
Benzoyl Peroxide Pregnancy And Lactation Text: This medication is Pregnancy Category C. It is unknown if benzoyl peroxide is excreted in breast milk.
Dapsone Pregnancy And Lactation Text: This medication is Pregnancy Category C and is not considered safe during pregnancy or breast feeding.
Spironolactone Counseling: Patient advised regarding risks of diarrhea, abdominal pain, hyperkalemia, birth defects (for female patients), liver toxicity and renal toxicity. The patient may need blood work to monitor liver and kidney function and potassium levels while on therapy. The patient verbalized understanding of the proper use and possible adverse effects of spironolactone.  All of the patient's questions and concerns were addressed.
Tazorac Pregnancy And Lactation Text: This medication is not safe during pregnancy. It is unknown if this medication is excreted in breast milk.
Winlevi Counseling:  I discussed with the patient the risks of topical clascoterone including but not limited to erythema, scaling, itching, and stinging. Patient voiced their understanding.
Doxycycline Counseling:  Patient counseled regarding possible photosensitivity and increased risk for sunburn.  Patient instructed to avoid sunlight, if possible.  When exposed to sunlight, patients should wear protective clothing, sunglasses, and sunscreen.  The patient was instructed to call the office immediately if the following severe adverse effects occur:  hearing changes, easy bruising/bleeding, severe headache, or vision changes.  The patient verbalized understanding of the proper use and possible adverse effects of doxycycline.  All of the patient's questions and concerns were addressed.
Isotretinoin Counseling: Patient should get monthly blood tests, not donate blood, not drive at night if vision affected, not share medication, and not undergo elective surgery for 6 months after tx completed. Side effects reviewed, pt to contact office should one occur.
Aklief Pregnancy And Lactation Text: It is unknown if this medication is safe to use during pregnancy.  It is unknown if this medication is excreted in breast milk.  Breastfeeding women should use the topical cream on the smallest area of the skin for the shortest time needed while breastfeeding.  Do not apply to nipple and areola.
Azithromycin Counseling:  I discussed with the patient the risks of azithromycin including but not limited to GI upset, allergic reaction, drug rash, diarrhea, and yeast infections.
Topical Retinoid counseling:  Patient advised to apply a pea-sized amount only at bedtime and wait 30 minutes after washing their face before applying.  If too drying, patient may add a non-comedogenic moisturizer. The patient verbalized understanding of the proper use and possible adverse effects of retinoids.  All of the patient's questions and concerns were addressed.
Spironolactone Pregnancy And Lactation Text: This medication can cause feminization of the male fetus and should be avoided during pregnancy. The active metabolite is also found in breast milk.
Birth Control Pills Counseling: Birth Control Pill Counseling: I discussed with the patient the potential side effects of OCPs including but not limited to increased risk of stroke, heart attack, thrombophlebitis, deep venous thrombosis, hepatic adenomas, breast changes, GI upset, headaches, and depression.  The patient verbalized understanding of the proper use and possible adverse effects of OCPs. All of the patient's questions and concerns were addressed.
Topical Clindamycin Counseling: Patient counseled that this medication may cause skin irritation or allergic reactions.  In the event of skin irritation, the patient was advised to reduce the amount of the drug applied or use it less frequently.   The patient verbalized understanding of the proper use and possible adverse effects of clindamycin.  All of the patient's questions and concerns were addressed.
Azelaic Acid Counseling: Patient counseled that medicine may cause skin irritation and to avoid applying near the eyes.  In the event of skin irritation, the patient was advised to reduce the amount of the drug applied or use it less frequently.   The patient verbalized understanding of the proper use and possible adverse effects of azelaic acid.  All of the patient's questions and concerns were addressed.
Topical Clindamycin Pregnancy And Lactation Text: This medication is Pregnancy Category B and is considered safe during pregnancy. It is unknown if it is excreted in breast milk.
Doxycycline Pregnancy And Lactation Text: This medication is Pregnancy Category D and not consider safe during pregnancy. It is also excreted in breast milk but is considered safe for shorter treatment courses.
Isotretinoin Pregnancy And Lactation Text: This medication is Pregnancy Category X and is considered extremely dangerous during pregnancy. It is unknown if it is excreted in breast milk.
Winlevi Pregnancy And Lactation Text: This medication is considered safe during pregnancy and breastfeeding.

## 2024-02-21 NOTE — PROCEDURE: PRESCRIPTION MEDICATION MANAGEMENT
Continue Regimen: Otezla 30 mg daily\\nKetoconazole shampoo TIW\\nTopical steroid solution qHS x 2 week increments prn (from outside provider, unsure of name; when refills needed, discussed clobetasol solution)
Detail Level: Zone
Render In Strict Bullet Format?: No
Samples Given: Zoryve
Plan: Long-standing history of psoriasis, mainly hands, feet, and scalp. On Otezla since 2022,  well-controlled on body. Still with occasional flares on scalp. Managed with ketoconazole shampoo, topical steroids. Samples today of Zoryve as above.\\nFailed treatments: topical steroids, Eucrisa. Unable to do nbUVB due to work restrictions.
Continue Regimen: Spironolactone 150 mg qHS\\nTretinoin 0.025% qHS

## 2024-03-11 ENCOUNTER — RX ONLY (OUTPATIENT)
Age: 34
Setting detail: RX ONLY
End: 2024-03-11

## 2024-03-11 RX ORDER — SPIRONOLACTONE 50 MG/1
1 TABLET, FILM COATED ORAL TID
Qty: 270 | Refills: 3 | Status: ERX

## 2024-04-01 ENCOUNTER — RX ONLY (OUTPATIENT)
Age: 34
Setting detail: RX ONLY
End: 2024-04-01

## 2024-04-01 RX ORDER — SPIRONOLACTONE 50 MG/1
1 TABLET, FILM COATED ORAL TID
Qty: 270 | Refills: 3 | Status: ERX

## 2024-07-15 ENCOUNTER — APPOINTMENT (RX ONLY)
Dept: URBAN - METROPOLITAN AREA CLINIC 23 | Facility: CLINIC | Age: 34
Setting detail: DERMATOLOGY
End: 2024-07-15

## 2024-07-15 DIAGNOSIS — D22 MELANOCYTIC NEVI: ICD-10-CM

## 2024-07-15 DIAGNOSIS — L81.4 OTHER MELANIN HYPERPIGMENTATION: ICD-10-CM

## 2024-07-15 DIAGNOSIS — L70.0 ACNE VULGARIS: ICD-10-CM | Status: WELL CONTROLLED

## 2024-07-15 DIAGNOSIS — L81.8 OTHER SPECIFIED DISORDERS OF PIGMENTATION: ICD-10-CM

## 2024-07-15 PROBLEM — D22.5 MELANOCYTIC NEVI OF TRUNK: Status: ACTIVE | Noted: 2024-07-15

## 2024-07-15 PROBLEM — D23.72 OTHER BENIGN NEOPLASM OF SKIN OF LEFT LOWER LIMB, INCLUDING HIP: Status: ACTIVE | Noted: 2024-07-15

## 2024-07-15 PROCEDURE — ? SUNSCREEN RECOMMENDATIONS

## 2024-07-15 PROCEDURE — ? PRESCRIPTION MEDICATION MANAGEMENT

## 2024-07-15 PROCEDURE — ? COUNSELING

## 2024-07-15 PROCEDURE — 99214 OFFICE O/P EST MOD 30 MIN: CPT

## 2024-07-15 PROCEDURE — ? ADDITIONAL NOTES

## 2024-07-15 PROCEDURE — ? FULL BODY SKIN EXAM

## 2024-07-15 ASSESSMENT — LOCATION DETAILED DESCRIPTION DERM
LOCATION DETAILED: SUPERIOR THORACIC SPINE
LOCATION DETAILED: LEFT INFERIOR CENTRAL MALAR CHEEK
LOCATION DETAILED: RIGHT MEDIAL UPPER BACK

## 2024-07-15 ASSESSMENT — LOCATION SIMPLE DESCRIPTION DERM
LOCATION SIMPLE: UPPER BACK
LOCATION SIMPLE: LEFT CHEEK
LOCATION SIMPLE: RIGHT UPPER BACK

## 2024-07-15 ASSESSMENT — LOCATION ZONE DERM
LOCATION ZONE: TRUNK
LOCATION ZONE: FACE

## 2024-07-15 ASSESSMENT — SEVERITY ASSESSMENT OVERALL AMONG ALL PATIENTS
IN YOUR EXPERIENCE, AMONG ALL PATIENTS YOU HAVE SEEN WITH THIS CONDITION, HOW SEVERE IS THIS PATIENT'S CONDITION?: NORMAL

## 2024-07-15 NOTE — PROCEDURE: COUNSELING
Detail Level: Generalized
Detail Level: Detailed
Sarecycline Counseling: Patient advised regarding possible photosensitivity and discoloration of the teeth, skin, lips, tongue and gums.  Patient instructed to avoid sunlight, if possible.  When exposed to sunlight, patients should wear protective clothing, sunglasses, and sunscreen.  The patient was instructed to call the office immediately if the following severe adverse effects occur:  hearing changes, easy bruising/bleeding, severe headache, or vision changes.  The patient verbalized understanding of the proper use and possible adverse effects of sarecycline.  All of the patient's questions and concerns were addressed.
Topical Retinoid Pregnancy And Lactation Text: This medication is Pregnancy Category C. It is unknown if this medication is excreted in breast milk.
Include Pregnancy/Lactation Warning?: No
Tetracycline Counseling: Patient counseled regarding possible photosensitivity and increased risk for sunburn.  Patient instructed to avoid sunlight, if possible.  When exposed to sunlight, patients should wear protective clothing, sunglasses, and sunscreen.  The patient was instructed to call the office immediately if the following severe adverse effects occur:  hearing changes, easy bruising/bleeding, severe headache, or vision changes.  The patient verbalized understanding of the proper use and possible adverse effects of tetracycline.  All of the patient's questions and concerns were addressed. Patient understands to avoid pregnancy while on therapy due to potential birth defects.
Birth Control Pills Pregnancy And Lactation Text: This medication should be avoided if pregnant and for the first 30 days post-partum.
Tetracycline Pregnancy And Lactation Text: This medication is Pregnancy Category D and not consider safe during pregnancy. It is also excreted in breast milk.
Azelaic Acid Pregnancy And Lactation Text: This medication is considered safe during pregnancy and breast feeding.
Azithromycin Pregnancy And Lactation Text: This medication is considered safe during pregnancy and is also secreted in breast milk.
Erythromycin Counseling:  I discussed with the patient the risks of erythromycin including but not limited to GI upset, allergic reaction, drug rash, diarrhea, increase in liver enzymes, and yeast infections.
High Dose Vitamin A Counseling: Side effects reviewed, pt to contact office should one occur.
Bactrim Counseling:  I discussed with the patient the risks of sulfa antibiotics including but not limited to GI upset, allergic reaction, drug rash, diarrhea, dizziness, photosensitivity, and yeast infections.  Rarely, more serious reactions can occur including but not limited to aplastic anemia, agranulocytosis, methemoglobinemia, blood dyscrasias, liver or kidney failure, lung infiltrates or desquamative/blistering drug rashes.
Tazorac Counseling:  Patient advised that medication is irritating and drying.  Patient may need to apply sparingly and wash off after an hour before eventually leaving it on overnight.  The patient verbalized understanding of the proper use and possible adverse effects of tazorac.  All of the patient's questions and concerns were addressed.
High Dose Vitamin A Pregnancy And Lactation Text: High dose vitamin A therapy is contraindicated during pregnancy and breast feeding.
Dapsone Counseling: I discussed with the patient the risks of dapsone including but not limited to hemolytic anemia, agranulocytosis, rashes, methemoglobinemia, kidney failure, peripheral neuropathy, headaches, GI upset, and liver toxicity.  Patients who start dapsone require monitoring including baseline LFTs and weekly CBCs for the first month, then every month thereafter.  The patient verbalized understanding of the proper use and possible adverse effects of dapsone.  All of the patient's questions and concerns were addressed.
Topical Sulfur Applications Counseling: Topical Sulfur Counseling: Patient counseled that this medication may cause skin irritation or allergic reactions.  In the event of skin irritation, the patient was advised to reduce the amount of the drug applied or use it less frequently.   The patient verbalized understanding of the proper use and possible adverse effects of topical sulfur application.  All of the patient's questions and concerns were addressed.
Topical Sulfur Applications Pregnancy And Lactation Text: This medication is Pregnancy Category C and has an unknown safety profile during pregnancy. It is unknown if this topical medication is excreted in breast milk.
Erythromycin Pregnancy And Lactation Text: This medication is Pregnancy Category B and is considered safe during pregnancy. It is also excreted in breast milk.
Aklief counseling:  Patient advised to apply a pea-sized amount only at bedtime and wait 30 minutes after washing their face before applying.  If too drying, patient may add a non-comedogenic moisturizer.  The most commonly reported side effects including irritation, redness, scaling, dryness, stinging, burning, itching, and increased risk of sunburn.  The patient verbalized understanding of the proper use and possible adverse effects of retinoids.  All of the patient's questions and concerns were addressed.
Benzoyl Peroxide Counseling: Patient counseled that medicine may cause skin irritation and bleach clothing.  In the event of skin irritation, the patient was advised to reduce the amount of the drug applied or use it less frequently.   The patient verbalized understanding of the proper use and possible adverse effects of benzoyl peroxide.  All of the patient's questions and concerns were addressed.
Bactrim Pregnancy And Lactation Text: This medication is Pregnancy Category D and is known to cause fetal risk.  It is also excreted in breast milk.
Minocycline Counseling: Patient advised regarding possible photosensitivity and discoloration of the teeth, skin, lips, tongue and gums.  Patient instructed to avoid sunlight, if possible.  When exposed to sunlight, patients should wear protective clothing, sunglasses, and sunscreen.  The patient was instructed to call the office immediately if the following severe adverse effects occur:  hearing changes, easy bruising/bleeding, severe headache, or vision changes.  The patient verbalized understanding of the proper use and possible adverse effects of minocycline.  All of the patient's questions and concerns were addressed.
Benzoyl Peroxide Pregnancy And Lactation Text: This medication is Pregnancy Category C. It is unknown if benzoyl peroxide is excreted in breast milk.
Dapsone Pregnancy And Lactation Text: This medication is Pregnancy Category C and is not considered safe during pregnancy or breast feeding.
Spironolactone Counseling: Patient advised regarding risks of diarrhea, abdominal pain, hyperkalemia, birth defects (for female patients), liver toxicity and renal toxicity. The patient may need blood work to monitor liver and kidney function and potassium levels while on therapy. The patient verbalized understanding of the proper use and possible adverse effects of spironolactone.  All of the patient's questions and concerns were addressed.
Tazorac Pregnancy And Lactation Text: This medication is not safe during pregnancy. It is unknown if this medication is excreted in breast milk.
Winlevi Counseling:  I discussed with the patient the risks of topical clascoterone including but not limited to erythema, scaling, itching, and stinging. Patient voiced their understanding.
Doxycycline Counseling:  Patient counseled regarding possible photosensitivity and increased risk for sunburn.  Patient instructed to avoid sunlight, if possible.  When exposed to sunlight, patients should wear protective clothing, sunglasses, and sunscreen.  The patient was instructed to call the office immediately if the following severe adverse effects occur:  hearing changes, easy bruising/bleeding, severe headache, or vision changes.  The patient verbalized understanding of the proper use and possible adverse effects of doxycycline.  All of the patient's questions and concerns were addressed.
Isotretinoin Counseling: Patient should get monthly blood tests, not donate blood, not drive at night if vision affected, not share medication, and not undergo elective surgery for 6 months after tx completed. Side effects reviewed, pt to contact office should one occur.
Aklief Pregnancy And Lactation Text: It is unknown if this medication is safe to use during pregnancy.  It is unknown if this medication is excreted in breast milk.  Breastfeeding women should use the topical cream on the smallest area of the skin for the shortest time needed while breastfeeding.  Do not apply to nipple and areola.
Azithromycin Counseling:  I discussed with the patient the risks of azithromycin including but not limited to GI upset, allergic reaction, drug rash, diarrhea, and yeast infections.
Topical Retinoid counseling:  Patient advised to apply a pea-sized amount only at bedtime and wait 30 minutes after washing their face before applying.  If too drying, patient may add a non-comedogenic moisturizer. The patient verbalized understanding of the proper use and possible adverse effects of retinoids.  All of the patient's questions and concerns were addressed.
Spironolactone Pregnancy And Lactation Text: This medication can cause feminization of the male fetus and should be avoided during pregnancy. The active metabolite is also found in breast milk.
Birth Control Pills Counseling: Birth Control Pill Counseling: I discussed with the patient the potential side effects of OCPs including but not limited to increased risk of stroke, heart attack, thrombophlebitis, deep venous thrombosis, hepatic adenomas, breast changes, GI upset, headaches, and depression.  The patient verbalized understanding of the proper use and possible adverse effects of OCPs. All of the patient's questions and concerns were addressed.
Topical Clindamycin Counseling: Patient counseled that this medication may cause skin irritation or allergic reactions.  In the event of skin irritation, the patient was advised to reduce the amount of the drug applied or use it less frequently.   The patient verbalized understanding of the proper use and possible adverse effects of clindamycin.  All of the patient's questions and concerns were addressed.
Detail Level: Zone
Azelaic Acid Counseling: Patient counseled that medicine may cause skin irritation and to avoid applying near the eyes.  In the event of skin irritation, the patient was advised to reduce the amount of the drug applied or use it less frequently.   The patient verbalized understanding of the proper use and possible adverse effects of azelaic acid.  All of the patient's questions and concerns were addressed.
Topical Clindamycin Pregnancy And Lactation Text: This medication is Pregnancy Category B and is considered safe during pregnancy. It is unknown if it is excreted in breast milk.
Doxycycline Pregnancy And Lactation Text: This medication is Pregnancy Category D and not consider safe during pregnancy. It is also excreted in breast milk but is considered safe for shorter treatment courses.
Isotretinoin Pregnancy And Lactation Text: This medication is Pregnancy Category X and is considered extremely dangerous during pregnancy. It is unknown if it is excreted in breast milk.
Winlevi Pregnancy And Lactation Text: This medication is considered safe during pregnancy and breastfeeding.

## 2024-07-15 NOTE — PROCEDURE: PRESCRIPTION MEDICATION MANAGEMENT
Detail Level: Zone
Render In Strict Bullet Format?: No
Continue Regimen: Spironolactone 150 mg qHS\\nTretinoin 0.025% qHS

## 2024-07-15 NOTE — PROCEDURE: ADDITIONAL NOTES
Detail Level: Simple
Additional Notes: Pt is taking supplements for gastrointestinal and eye health. Recommended d/c Lutein and Bilberry supplements for eye. Continue lactobacillus. Doesn’t eat a lot of orange vegetables and no self allison.
Render Risk Assessment In Note?: yes
Additional Notes: Getting  in fall, knows to discontinue acne meds if trying for baby.

## 2024-09-05 ENCOUNTER — RX ONLY (OUTPATIENT)
Age: 34
Setting detail: RX ONLY
End: 2024-09-05

## 2024-09-05 RX ORDER — APREMILAST 30 MG/1
TABLET, FILM COATED ORAL BID
Qty: 180 | Refills: 3 | Status: ERX

## 2025-01-07 ENCOUNTER — APPOINTMENT (OUTPATIENT)
Dept: URBAN - METROPOLITAN AREA CLINIC 26 | Facility: CLINIC | Age: 35
Setting detail: DERMATOLOGY
End: 2025-01-07

## 2025-01-07 ENCOUNTER — RX ONLY (RX ONLY)
Age: 35
End: 2025-01-07

## 2025-01-07 DIAGNOSIS — L70.0 ACNE VULGARIS: ICD-10-CM

## 2025-01-07 DIAGNOSIS — L40.0 PSORIASIS VULGARIS: ICD-10-CM

## 2025-01-07 PROCEDURE — 99214 OFFICE O/P EST MOD 30 MIN: CPT

## 2025-01-07 PROCEDURE — ? PRESCRIPTION MEDICATION MANAGEMENT

## 2025-01-07 PROCEDURE — ? COUNSELING

## 2025-01-07 RX ORDER — APREMILAST 30 MG/1
1 TABLET, FILM COATED ORAL BID
Qty: 60 | Refills: 3 | Status: ERX

## 2025-01-07 ASSESSMENT — LOCATION ZONE DERM
LOCATION ZONE: SCALP
LOCATION ZONE: FACE

## 2025-01-07 ASSESSMENT — LOCATION DETAILED DESCRIPTION DERM
LOCATION DETAILED: LEFT INFERIOR CENTRAL MALAR CHEEK
LOCATION DETAILED: LEFT MEDIAL FRONTAL SCALP

## 2025-01-07 ASSESSMENT — LOCATION SIMPLE DESCRIPTION DERM
LOCATION SIMPLE: LEFT SCALP
LOCATION SIMPLE: LEFT CHEEK

## 2025-01-07 NOTE — PROCEDURE: PRESCRIPTION MEDICATION MANAGEMENT
Discontinue Regimen: Otezla 30 mg BID (family planning this spring, understands to stop medication, pregnancy category C)
Continue Regimen: Ketoconazole shampoo TIW\\nClobetasol scalp solution qHS x 2 week increments and triamcinolone cream BID x 2 week increments prn (from outside provider, will call when refills needed)
Detail Level: Zone
Render In Strict Bullet Format?: No
Plan: Long-standing history of psoriasis, mainly hands, feet, and scalp. On Otezla since 2022, well-controlled on body. Still with occasional flares on scalp. Managed with ketoconazole shampoo, topical steroids.\\n\\nFamily planning in spring, understands to stop Otezla.
Discontinue Regimen: Spironolactone 150 mg qHS\\nTretinoin 0.025% qHS
Plan: Family planning in spring, understands to stop spironolactone and tretinoin pregnancy category C\\nWill call prn for topical clindamycin

## 2025-04-14 ENCOUNTER — RX ONLY (RX ONLY)
Age: 35
End: 2025-04-14

## 2025-04-14 RX ORDER — SPIRONOLACTONE 50 MG/1
TABLET, FILM COATED ORAL
Qty: 270 | Refills: 4 | Status: ERX | COMMUNITY
Start: 2025-04-14

## 2025-06-19 ENCOUNTER — RX ONLY (RX ONLY)
Age: 35
End: 2025-06-19

## 2025-06-19 RX ORDER — APREMILAST 30 MG/1
1 TABLET, FILM COATED ORAL BID
Qty: 60 | Refills: 3 | Status: CANCELLED
Stop reason: ENTERED-IN-ERROR

## 2025-07-21 ENCOUNTER — APPOINTMENT (OUTPATIENT)
Dept: URBAN - METROPOLITAN AREA CLINIC 23 | Facility: CLINIC | Age: 35
Setting detail: DERMATOLOGY
End: 2025-07-21

## 2025-07-21 DIAGNOSIS — L81.4 OTHER MELANIN HYPERPIGMENTATION: ICD-10-CM

## 2025-07-21 DIAGNOSIS — D22 MELANOCYTIC NEVI: ICD-10-CM

## 2025-07-21 DIAGNOSIS — L40.0 PSORIASIS VULGARIS: ICD-10-CM | Status: INADEQUATELY CONTROLLED

## 2025-07-21 DIAGNOSIS — L82.1 OTHER SEBORRHEIC KERATOSIS: ICD-10-CM

## 2025-07-21 DIAGNOSIS — L70.0 ACNE VULGARIS: ICD-10-CM | Status: INADEQUATELY CONTROLLED

## 2025-07-21 PROBLEM — D22.5 MELANOCYTIC NEVI OF TRUNK: Status: ACTIVE | Noted: 2025-07-21

## 2025-07-21 PROBLEM — D23.72 OTHER BENIGN NEOPLASM OF SKIN OF LEFT LOWER LIMB, INCLUDING HIP: Status: ACTIVE | Noted: 2025-07-21

## 2025-07-21 PROCEDURE — ? COUNSELING

## 2025-07-21 PROCEDURE — ? FULL BODY SKIN EXAM

## 2025-07-21 PROCEDURE — ? SUNSCREEN RECOMMENDATIONS

## 2025-07-21 PROCEDURE — ? PRESCRIPTION

## 2025-07-21 PROCEDURE — ? PRESCRIPTION MEDICATION MANAGEMENT

## 2025-07-21 RX ORDER — AZELAIC ACID 0.15 G/G
1 GEL TOPICAL
Qty: 100 | Refills: 3 | Status: ERX | COMMUNITY
Start: 2025-07-21

## 2025-07-21 RX ORDER — CALCIPOTRIENE 0.05 MG/ML
1 SOLUTION TOPICAL
Qty: 120 | Refills: 3 | Status: ERX | COMMUNITY
Start: 2025-07-21

## 2025-07-21 RX ADMIN — AZELAIC ACID 1: 0.15 GEL TOPICAL at 00:00

## 2025-07-21 RX ADMIN — CALCIPOTRIENE 1: 0.05 SOLUTION TOPICAL at 00:00

## 2025-07-21 ASSESSMENT — LOCATION ZONE DERM
LOCATION ZONE: ARM
LOCATION ZONE: TRUNK
LOCATION ZONE: FACE
LOCATION ZONE: TRUNK
LOCATION ZONE: SCALP
LOCATION ZONE: HAND

## 2025-07-21 ASSESSMENT — LOCATION SIMPLE DESCRIPTION DERM
LOCATION SIMPLE: CHEST
LOCATION SIMPLE: UPPER BACK
LOCATION SIMPLE: RIGHT HAND
LOCATION SIMPLE: LEFT SCALP
LOCATION SIMPLE: LEFT CHEEK
LOCATION SIMPLE: RIGHT POSTERIOR UPPER ARM
LOCATION SIMPLE: LEFT UPPER BACK

## 2025-07-21 ASSESSMENT — LOCATION DETAILED DESCRIPTION DERM
LOCATION DETAILED: RIGHT DISTAL POSTERIOR UPPER ARM
LOCATION DETAILED: LEFT INFERIOR CENTRAL MALAR CHEEK
LOCATION DETAILED: UPPER STERNUM
LOCATION DETAILED: SUPERIOR THORACIC SPINE
LOCATION DETAILED: RIGHT RADIAL DORSAL HAND
LOCATION DETAILED: LEFT SUPERIOR MEDIAL UPPER BACK
LOCATION DETAILED: LEFT MEDIAL FRONTAL SCALP

## 2025-07-21 NOTE — PROCEDURE: COUNSELING
Detail Level: Generalized
Detail Level: Zone
Sarecycline Counseling: Patient advised regarding possible photosensitivity and discoloration of the teeth, skin, lips, tongue and gums.  Patient instructed to avoid sunlight, if possible.  When exposed to sunlight, patients should wear protective clothing, sunglasses, and sunscreen.  The patient was instructed to call the office immediately if the following severe adverse effects occur:  hearing changes, easy bruising/bleeding, severe headache, or vision changes.  The patient verbalized understanding of the proper use and possible adverse effects of sarecycline.  All of the patient's questions and concerns were addressed.
Topical Retinoid Pregnancy And Lactation Text: This medication is Pregnancy Category C. It is unknown if this medication is excreted in breast milk.
Include Pregnancy/Lactation Warning?: No
Tetracycline Counseling: Patient counseled regarding possible photosensitivity and increased risk for sunburn.  Patient instructed to avoid sunlight, if possible.  When exposed to sunlight, patients should wear protective clothing, sunglasses, and sunscreen.  The patient was instructed to call the office immediately if the following severe adverse effects occur:  hearing changes, easy bruising/bleeding, severe headache, or vision changes.  The patient verbalized understanding of the proper use and possible adverse effects of tetracycline.  All of the patient's questions and concerns were addressed. Patient understands to avoid pregnancy while on therapy due to potential birth defects.
Birth Control Pills Pregnancy And Lactation Text: This medication should be avoided if pregnant and for the first 30 days post-partum.
Tetracycline Pregnancy And Lactation Text: This medication is Pregnancy Category D and not consider safe during pregnancy. It is also excreted in breast milk.
Azelaic Acid Pregnancy And Lactation Text: This medication is considered safe during pregnancy and breast feeding.
Azithromycin Pregnancy And Lactation Text: This medication is considered safe during pregnancy and is also secreted in breast milk.
Erythromycin Counseling:  I discussed with the patient the risks of erythromycin including but not limited to GI upset, allergic reaction, drug rash, diarrhea, increase in liver enzymes, and yeast infections.
High Dose Vitamin A Counseling: Side effects reviewed, pt to contact office should one occur.
Bactrim Counseling:  I discussed with the patient the risks of sulfa antibiotics including but not limited to GI upset, allergic reaction, drug rash, diarrhea, dizziness, photosensitivity, and yeast infections.  Rarely, more serious reactions can occur including but not limited to aplastic anemia, agranulocytosis, methemoglobinemia, blood dyscrasias, liver or kidney failure, lung infiltrates or desquamative/blistering drug rashes.
Tazorac Counseling:  Patient advised that medication is irritating and drying.  Patient may need to apply sparingly and wash off after an hour before eventually leaving it on overnight.  The patient verbalized understanding of the proper use and possible adverse effects of tazorac.  All of the patient's questions and concerns were addressed.
High Dose Vitamin A Pregnancy And Lactation Text: High dose vitamin A therapy is contraindicated during pregnancy and breast feeding.
Dapsone Counseling: I discussed with the patient the risks of dapsone including but not limited to hemolytic anemia, agranulocytosis, rashes, methemoglobinemia, kidney failure, peripheral neuropathy, headaches, GI upset, and liver toxicity.  Patients who start dapsone require monitoring including baseline LFTs and weekly CBCs for the first month, then every month thereafter.  The patient verbalized understanding of the proper use and possible adverse effects of dapsone.  All of the patient's questions and concerns were addressed.
Topical Sulfur Applications Counseling: Topical Sulfur Counseling: Patient counseled that this medication may cause skin irritation or allergic reactions.  In the event of skin irritation, the patient was advised to reduce the amount of the drug applied or use it less frequently.   The patient verbalized understanding of the proper use and possible adverse effects of topical sulfur application.  All of the patient's questions and concerns were addressed.
Topical Sulfur Applications Pregnancy And Lactation Text: This medication is Pregnancy Category C and has an unknown safety profile during pregnancy. It is unknown if this topical medication is excreted in breast milk.
Erythromycin Pregnancy And Lactation Text: This medication is Pregnancy Category B and is considered safe during pregnancy. It is also excreted in breast milk.
Aklief counseling:  Patient advised to apply a pea-sized amount only at bedtime and wait 30 minutes after washing their face before applying.  If too drying, patient may add a non-comedogenic moisturizer.  The most commonly reported side effects including irritation, redness, scaling, dryness, stinging, burning, itching, and increased risk of sunburn.  The patient verbalized understanding of the proper use and possible adverse effects of retinoids.  All of the patient's questions and concerns were addressed.
Benzoyl Peroxide Counseling: Patient counseled that medicine may cause skin irritation and bleach clothing.  In the event of skin irritation, the patient was advised to reduce the amount of the drug applied or use it less frequently.   The patient verbalized understanding of the proper use and possible adverse effects of benzoyl peroxide.  All of the patient's questions and concerns were addressed.
Bactrim Pregnancy And Lactation Text: This medication is Pregnancy Category D and is known to cause fetal risk.  It is also excreted in breast milk.
Minocycline Counseling: Patient advised regarding possible photosensitivity and discoloration of the teeth, skin, lips, tongue and gums.  Patient instructed to avoid sunlight, if possible.  When exposed to sunlight, patients should wear protective clothing, sunglasses, and sunscreen.  The patient was instructed to call the office immediately if the following severe adverse effects occur:  hearing changes, easy bruising/bleeding, severe headache, or vision changes.  The patient verbalized understanding of the proper use and possible adverse effects of minocycline.  All of the patient's questions and concerns were addressed.
Benzoyl Peroxide Pregnancy And Lactation Text: This medication is Pregnancy Category C. It is unknown if benzoyl peroxide is excreted in breast milk.
Dapsone Pregnancy And Lactation Text: This medication is Pregnancy Category C and is not considered safe during pregnancy or breast feeding.
Spironolactone Counseling: Patient advised regarding risks of diarrhea, abdominal pain, hyperkalemia, birth defects (for female patients), liver toxicity and renal toxicity. The patient may need blood work to monitor liver and kidney function and potassium levels while on therapy. The patient verbalized understanding of the proper use and possible adverse effects of spironolactone.  All of the patient's questions and concerns were addressed.
Tazorac Pregnancy And Lactation Text: This medication is not safe during pregnancy. It is unknown if this medication is excreted in breast milk.
Winlevi Counseling:  I discussed with the patient the risks of topical clascoterone including but not limited to erythema, scaling, itching, and stinging. Patient voiced their understanding.
Doxycycline Counseling:  Patient counseled regarding possible photosensitivity and increased risk for sunburn.  Patient instructed to avoid sunlight, if possible.  When exposed to sunlight, patients should wear protective clothing, sunglasses, and sunscreen.  The patient was instructed to call the office immediately if the following severe adverse effects occur:  hearing changes, easy bruising/bleeding, severe headache, or vision changes.  The patient verbalized understanding of the proper use and possible adverse effects of doxycycline.  All of the patient's questions and concerns were addressed.
Isotretinoin Counseling: Patient should get monthly blood tests, not donate blood, not drive at night if vision affected, not share medication, and not undergo elective surgery for 6 months after tx completed. Side effects reviewed, pt to contact office should one occur.
Aklief Pregnancy And Lactation Text: It is unknown if this medication is safe to use during pregnancy.  It is unknown if this medication is excreted in breast milk.  Breastfeeding women should use the topical cream on the smallest area of the skin for the shortest time needed while breastfeeding.  Do not apply to nipple and areola.
Azithromycin Counseling:  I discussed with the patient the risks of azithromycin including but not limited to GI upset, allergic reaction, drug rash, diarrhea, and yeast infections.
Topical Retinoid counseling:  Patient advised to apply a pea-sized amount only at bedtime and wait 30 minutes after washing their face before applying.  If too drying, patient may add a non-comedogenic moisturizer. The patient verbalized understanding of the proper use and possible adverse effects of retinoids.  All of the patient's questions and concerns were addressed.
Spironolactone Pregnancy And Lactation Text: This medication can cause feminization of the male fetus and should be avoided during pregnancy. The active metabolite is also found in breast milk.
Birth Control Pills Counseling: Birth Control Pill Counseling: I discussed with the patient the potential side effects of OCPs including but not limited to increased risk of stroke, heart attack, thrombophlebitis, deep venous thrombosis, hepatic adenomas, breast changes, GI upset, headaches, and depression.  The patient verbalized understanding of the proper use and possible adverse effects of OCPs. All of the patient's questions and concerns were addressed.
Topical Clindamycin Counseling: Patient counseled that this medication may cause skin irritation or allergic reactions.  In the event of skin irritation, the patient was advised to reduce the amount of the drug applied or use it less frequently.   The patient verbalized understanding of the proper use and possible adverse effects of clindamycin.  All of the patient's questions and concerns were addressed.
Azelaic Acid Counseling: Patient counseled that medicine may cause skin irritation and to avoid applying near the eyes.  In the event of skin irritation, the patient was advised to reduce the amount of the drug applied or use it less frequently.   The patient verbalized understanding of the proper use and possible adverse effects of azelaic acid.  All of the patient's questions and concerns were addressed.
Topical Clindamycin Pregnancy And Lactation Text: This medication is Pregnancy Category B and is considered safe during pregnancy. It is unknown if it is excreted in breast milk.
Doxycycline Pregnancy And Lactation Text: This medication is Pregnancy Category D and not consider safe during pregnancy. It is also excreted in breast milk but is considered safe for shorter treatment courses.
Isotretinoin Pregnancy And Lactation Text: This medication is Pregnancy Category X and is considered extremely dangerous during pregnancy. It is unknown if it is excreted in breast milk.
Winlevi Pregnancy And Lactation Text: This medication is considered safe during pregnancy and breastfeeding.
Detail Level: Simple

## 2025-07-21 NOTE — PROCEDURE: PRESCRIPTION MEDICATION MANAGEMENT
Detail Level: Zone
Render In Strict Bullet Format?: No
Plan: Trying to get pregnant- pt understands this limits options
Initiate Treatment: azelaic acid 15 % topical gel: Apply to affected areas of acne once-twice daily
Continue Regimen: Ketoconazole shampoo TIW\\nClobetasol scalp solution qHS x 2 week increments and triamcinolone cream BID x 2 week increments prn (from outside provider, will call when refills needed)
Initiate Treatment: calcipotriene 0.005 % scalp solution: Apply BID to scalp prn
Plan: Long-standing history of psoriasis, mainly hands, feet, and scalp. On Otezla since 2022, well-controlled on body. Still with occasional flares on scalp. Managed with ketoconazole shampoo, topical steroids.\\n\\nWill add in non-steroidal topical as pt is trying to get pregnant and Otezla is not an option right now

## 2025-07-21 NOTE — HPI: EVALUATION OF SKIN LESION(S)
What Type Of Note Output Would You Prefer (Optional)?: Bullet Format
Hpi Title: Evaluation of Skin Lesions
Additional History: Changing spot of n right hand and concerned about a spot on back of right arm